# Patient Record
Sex: MALE | Race: WHITE | Employment: OTHER | URBAN - NONMETROPOLITAN AREA
[De-identification: names, ages, dates, MRNs, and addresses within clinical notes are randomized per-mention and may not be internally consistent; named-entity substitution may affect disease eponyms.]

---

## 2017-01-26 ENCOUNTER — PROCEDURE VISIT (OUTPATIENT)
Dept: CARDIOLOGY | Age: 52
End: 2017-01-26

## 2017-01-26 DIAGNOSIS — Z95.810 S/P ICD (INTERNAL CARDIAC DEFIBRILLATOR) PROCEDURE: Primary | ICD-10-CM

## 2017-01-26 PROCEDURE — 93295 DEV INTERROG REMOTE 1/2/MLT: CPT | Performed by: INTERNAL MEDICINE

## 2017-01-26 PROCEDURE — 93296 REM INTERROG EVL PM/IDS: CPT | Performed by: INTERNAL MEDICINE

## 2017-04-27 ENCOUNTER — PROCEDURE VISIT (OUTPATIENT)
Dept: CARDIOLOGY | Age: 52
End: 2017-04-27

## 2017-04-27 DIAGNOSIS — Z95.810 S/P ICD (INTERNAL CARDIAC DEFIBRILLATOR) PROCEDURE: Primary | ICD-10-CM

## 2017-04-27 PROCEDURE — 93295 DEV INTERROG REMOTE 1/2/MLT: CPT | Performed by: INTERNAL MEDICINE

## 2017-04-27 PROCEDURE — 93296 REM INTERROG EVL PM/IDS: CPT | Performed by: INTERNAL MEDICINE

## 2017-05-17 ENCOUNTER — OFFICE VISIT (OUTPATIENT)
Dept: CARDIOLOGY | Age: 52
End: 2017-05-17

## 2017-05-17 VITALS
BODY MASS INDEX: 31.36 KG/M2 | SYSTOLIC BLOOD PRESSURE: 132 MMHG | HEIGHT: 71 IN | DIASTOLIC BLOOD PRESSURE: 80 MMHG | HEART RATE: 64 BPM | WEIGHT: 224 LBS

## 2017-05-17 DIAGNOSIS — I21.4 NSTEMI (NON-ST ELEVATED MYOCARDIAL INFARCTION) (HCC): ICD-10-CM

## 2017-05-17 DIAGNOSIS — Z98.890 S/P CARDIAC CATHETERIZATION: Primary | ICD-10-CM

## 2017-05-17 DIAGNOSIS — Z95.810 S/P ICD (INTERNAL CARDIAC DEFIBRILLATOR) PROCEDURE: ICD-10-CM

## 2017-05-17 PROCEDURE — 99213 OFFICE O/P EST LOW 20 MIN: CPT | Performed by: INTERNAL MEDICINE

## 2017-05-17 RX ORDER — ATORVASTATIN CALCIUM 40 MG/1
40 TABLET, FILM COATED ORAL NIGHTLY
Qty: 90 TABLET | Refills: 1 | Status: SHIPPED | OUTPATIENT
Start: 2017-05-17 | End: 2017-11-13 | Stop reason: SDUPTHER

## 2017-05-17 RX ORDER — RAMIPRIL 5 MG/1
5 CAPSULE ORAL NIGHTLY
Qty: 90 CAPSULE | Refills: 1 | Status: SHIPPED | OUTPATIENT
Start: 2017-05-17 | End: 2017-11-13 | Stop reason: SDUPTHER

## 2017-05-17 RX ORDER — CLOPIDOGREL BISULFATE 75 MG/1
75 TABLET ORAL DAILY
Qty: 90 TABLET | Refills: 1 | Status: SHIPPED | OUTPATIENT
Start: 2017-05-17 | End: 2017-11-13 | Stop reason: SDUPTHER

## 2017-05-17 ASSESSMENT — ENCOUNTER SYMPTOMS
EYES NEGATIVE: 1
RESPIRATORY NEGATIVE: 1
GASTROINTESTINAL NEGATIVE: 1

## 2017-07-26 ENCOUNTER — PROCEDURE VISIT (OUTPATIENT)
Dept: CARDIOLOGY CLINIC | Age: 52
End: 2017-07-26

## 2017-07-26 DIAGNOSIS — Z95.810 S/P ICD (INTERNAL CARDIAC DEFIBRILLATOR) PROCEDURE: Primary | ICD-10-CM

## 2017-10-18 ENCOUNTER — PROCEDURE VISIT (OUTPATIENT)
Dept: CARDIOLOGY CLINIC | Age: 52
End: 2017-10-18
Payer: COMMERCIAL

## 2017-10-18 DIAGNOSIS — Z95.810 S/P ICD (INTERNAL CARDIAC DEFIBRILLATOR) PROCEDURE: Primary | ICD-10-CM

## 2017-10-18 PROCEDURE — 93296 REM INTERROG EVL PM/IDS: CPT | Performed by: INTERNAL MEDICINE

## 2017-10-18 PROCEDURE — 93295 DEV INTERROG REMOTE 1/2/MLT: CPT | Performed by: INTERNAL MEDICINE

## 2017-11-13 RX ORDER — CLOPIDOGREL BISULFATE 75 MG/1
75 TABLET ORAL DAILY
Qty: 90 TABLET | Refills: 1 | Status: SHIPPED | OUTPATIENT
Start: 2017-11-13 | End: 2018-03-19 | Stop reason: SDUPTHER

## 2017-11-13 RX ORDER — RAMIPRIL 5 MG/1
5 CAPSULE ORAL NIGHTLY
Qty: 90 CAPSULE | Refills: 1 | Status: SHIPPED | OUTPATIENT
Start: 2017-11-13 | End: 2018-03-19 | Stop reason: SDUPTHER

## 2017-11-13 RX ORDER — ATORVASTATIN CALCIUM 40 MG/1
40 TABLET, FILM COATED ORAL NIGHTLY
Qty: 90 TABLET | Refills: 1 | Status: SHIPPED | OUTPATIENT
Start: 2017-11-13 | End: 2017-12-13 | Stop reason: DRUGHIGH

## 2017-12-13 ENCOUNTER — OFFICE VISIT (OUTPATIENT)
Dept: CARDIOLOGY CLINIC | Age: 52
End: 2017-12-13
Payer: COMMERCIAL

## 2017-12-13 VITALS
HEART RATE: 61 BPM | DIASTOLIC BLOOD PRESSURE: 66 MMHG | HEIGHT: 72 IN | WEIGHT: 221.4 LBS | BODY MASS INDEX: 29.99 KG/M2 | SYSTOLIC BLOOD PRESSURE: 132 MMHG

## 2017-12-13 DIAGNOSIS — I25.119 CORONARY ARTERY DISEASE INVOLVING NATIVE CORONARY ARTERY OF NATIVE HEART WITH ANGINA PECTORIS (HCC): Primary | ICD-10-CM

## 2017-12-13 PROCEDURE — 99213 OFFICE O/P EST LOW 20 MIN: CPT | Performed by: INTERNAL MEDICINE

## 2017-12-13 PROCEDURE — 93000 ELECTROCARDIOGRAM COMPLETE: CPT | Performed by: INTERNAL MEDICINE

## 2017-12-13 RX ORDER — ATORVASTATIN CALCIUM 80 MG/1
80 TABLET, FILM COATED ORAL NIGHTLY
Qty: 90 TABLET | Refills: 3 | Status: SHIPPED | OUTPATIENT
Start: 2017-12-13 | End: 2018-03-26 | Stop reason: SDUPTHER

## 2017-12-13 ASSESSMENT — ENCOUNTER SYMPTOMS
GASTROINTESTINAL NEGATIVE: 1
EYES NEGATIVE: 1
RESPIRATORY NEGATIVE: 1

## 2017-12-13 NOTE — PROGRESS NOTES
Patient is here for 6 month follow up    EKG done today. Denies cp, sob palpitations, dizziness and YAKELIN.

## 2018-01-23 ENCOUNTER — PROCEDURE VISIT (OUTPATIENT)
Dept: CARDIOLOGY CLINIC | Age: 53
End: 2018-01-23
Payer: COMMERCIAL

## 2018-01-23 DIAGNOSIS — Z95.810 S/P ICD (INTERNAL CARDIAC DEFIBRILLATOR) PROCEDURE: Primary | ICD-10-CM

## 2018-01-23 PROCEDURE — 93261 INTERROGATE SUBQ DEFIB: CPT | Performed by: INTERNAL MEDICINE

## 2018-01-24 ENCOUNTER — TELEPHONE (OUTPATIENT)
Dept: CARDIOLOGY CLINIC | Age: 53
End: 2018-01-24

## 2018-03-08 ENCOUNTER — TELEPHONE (OUTPATIENT)
Dept: CARDIOLOGY CLINIC | Age: 53
End: 2018-03-08

## 2018-03-09 ENCOUNTER — PROCEDURE VISIT (OUTPATIENT)
Dept: CARDIOLOGY CLINIC | Age: 53
End: 2018-03-09
Payer: COMMERCIAL

## 2018-03-09 DIAGNOSIS — Z95.810 S/P ICD (INTERNAL CARDIAC DEFIBRILLATOR) PROCEDURE: Primary | ICD-10-CM

## 2018-03-09 PROCEDURE — 93261 INTERROGATE SUBQ DEFIB: CPT | Performed by: INTERNAL MEDICINE

## 2018-03-19 RX ORDER — ATORVASTATIN CALCIUM 40 MG/1
TABLET, FILM COATED ORAL
Qty: 90 TABLET | Refills: 0 | Status: SHIPPED | OUTPATIENT
Start: 2018-03-19 | End: 2018-05-30 | Stop reason: ALTCHOICE

## 2018-03-19 RX ORDER — RAMIPRIL 5 MG/1
CAPSULE ORAL
Qty: 90 CAPSULE | Refills: 0 | Status: SHIPPED | OUTPATIENT
Start: 2018-03-19 | End: 2018-05-30 | Stop reason: SDUPTHER

## 2018-03-19 RX ORDER — CLOPIDOGREL BISULFATE 75 MG/1
TABLET ORAL
Qty: 90 TABLET | Refills: 0 | Status: SHIPPED | OUTPATIENT
Start: 2018-03-19 | End: 2018-05-30 | Stop reason: SDUPTHER

## 2018-03-28 RX ORDER — ATORVASTATIN CALCIUM 80 MG/1
80 TABLET, FILM COATED ORAL NIGHTLY
Qty: 90 TABLET | Refills: 3 | OUTPATIENT
Start: 2018-03-28 | End: 2018-08-08 | Stop reason: SDUPTHER

## 2018-04-02 ENCOUNTER — TELEPHONE (OUTPATIENT)
Dept: CARDIOLOGY CLINIC | Age: 53
End: 2018-04-02

## 2018-04-25 ENCOUNTER — PROCEDURE VISIT (OUTPATIENT)
Dept: CARDIOLOGY CLINIC | Age: 53
End: 2018-04-25
Payer: COMMERCIAL

## 2018-04-25 DIAGNOSIS — Z95.810 S/P ICD (INTERNAL CARDIAC DEFIBRILLATOR) PROCEDURE: Primary | ICD-10-CM

## 2018-04-25 PROCEDURE — 93261 INTERROGATE SUBQ DEFIB: CPT | Performed by: INTERNAL MEDICINE

## 2018-05-30 ENCOUNTER — OFFICE VISIT (OUTPATIENT)
Dept: CARDIOLOGY CLINIC | Age: 53
End: 2018-05-30
Payer: COMMERCIAL

## 2018-05-30 VITALS
HEART RATE: 71 BPM | DIASTOLIC BLOOD PRESSURE: 74 MMHG | WEIGHT: 223 LBS | HEIGHT: 71 IN | BODY MASS INDEX: 31.22 KG/M2 | SYSTOLIC BLOOD PRESSURE: 132 MMHG

## 2018-05-30 DIAGNOSIS — Z95.810 S/P ICD (INTERNAL CARDIAC DEFIBRILLATOR) PROCEDURE: ICD-10-CM

## 2018-05-30 DIAGNOSIS — Z98.890 S/P CARDIAC CATHETERIZATION: ICD-10-CM

## 2018-05-30 DIAGNOSIS — I25.119 CORONARY ARTERY DISEASE INVOLVING NATIVE CORONARY ARTERY OF NATIVE HEART WITH ANGINA PECTORIS (HCC): Primary | ICD-10-CM

## 2018-05-30 PROCEDURE — 93000 ELECTROCARDIOGRAM COMPLETE: CPT | Performed by: INTERNAL MEDICINE

## 2018-05-30 PROCEDURE — 99213 OFFICE O/P EST LOW 20 MIN: CPT | Performed by: INTERNAL MEDICINE

## 2018-05-30 RX ORDER — RAMIPRIL 5 MG/1
CAPSULE ORAL
Qty: 90 CAPSULE | Refills: 3 | Status: SHIPPED | OUTPATIENT
Start: 2018-05-30 | End: 2019-08-19 | Stop reason: SDUPTHER

## 2018-05-30 RX ORDER — NITROGLYCERIN 0.4 MG/1
TABLET SUBLINGUAL
Qty: 25 TABLET | Refills: 3 | Status: SHIPPED | OUTPATIENT
Start: 2018-05-30

## 2018-05-30 RX ORDER — CLOPIDOGREL BISULFATE 75 MG/1
TABLET ORAL
Qty: 90 TABLET | Refills: 3 | Status: SHIPPED | OUTPATIENT
Start: 2018-05-30 | End: 2019-08-19 | Stop reason: SDUPTHER

## 2018-05-30 ASSESSMENT — ENCOUNTER SYMPTOMS
GASTROINTESTINAL NEGATIVE: 1
EYES NEGATIVE: 1
RESPIRATORY NEGATIVE: 1

## 2018-07-25 ENCOUNTER — PROCEDURE VISIT (OUTPATIENT)
Dept: CARDIOLOGY CLINIC | Age: 53
End: 2018-07-25
Payer: COMMERCIAL

## 2018-07-25 DIAGNOSIS — Z95.810 S/P ICD (INTERNAL CARDIAC DEFIBRILLATOR) PROCEDURE: Primary | ICD-10-CM

## 2018-07-25 PROCEDURE — 93296 REM INTERROG EVL PM/IDS: CPT | Performed by: INTERNAL MEDICINE

## 2018-07-25 PROCEDURE — 93295 DEV INTERROG REMOTE 1/2/MLT: CPT | Performed by: INTERNAL MEDICINE

## 2018-08-08 RX ORDER — ATORVASTATIN CALCIUM 80 MG/1
80 TABLET, FILM COATED ORAL NIGHTLY
Qty: 90 TABLET | Refills: 3 | Status: SHIPPED | OUTPATIENT
Start: 2018-08-08 | End: 2019-01-14 | Stop reason: SDUPTHER

## 2018-10-31 ENCOUNTER — PROCEDURE VISIT (OUTPATIENT)
Dept: CARDIOLOGY CLINIC | Age: 53
End: 2018-10-31
Payer: COMMERCIAL

## 2018-10-31 DIAGNOSIS — Z95.810 S/P ICD (INTERNAL CARDIAC DEFIBRILLATOR) PROCEDURE: Primary | ICD-10-CM

## 2018-10-31 PROCEDURE — 93295 DEV INTERROG REMOTE 1/2/MLT: CPT | Performed by: INTERNAL MEDICINE

## 2018-10-31 PROCEDURE — 93296 REM INTERROG EVL PM/IDS: CPT | Performed by: INTERNAL MEDICINE

## 2018-12-17 ENCOUNTER — TELEPHONE (OUTPATIENT)
Dept: CARDIOLOGY CLINIC | Age: 53
End: 2018-12-17

## 2019-01-02 ENCOUNTER — OFFICE VISIT (OUTPATIENT)
Dept: CARDIOLOGY CLINIC | Age: 54
End: 2019-01-02
Payer: COMMERCIAL

## 2019-01-02 VITALS
SYSTOLIC BLOOD PRESSURE: 132 MMHG | HEIGHT: 71 IN | HEART RATE: 58 BPM | WEIGHT: 228 LBS | DIASTOLIC BLOOD PRESSURE: 88 MMHG | BODY MASS INDEX: 31.92 KG/M2

## 2019-01-02 DIAGNOSIS — I25.10 CORONARY ARTERY DISEASE INVOLVING NATIVE CORONARY ARTERY OF NATIVE HEART WITHOUT ANGINA PECTORIS: Primary | ICD-10-CM

## 2019-01-02 DIAGNOSIS — E78.01 FAMILIAL HYPERCHOLESTEROLEMIA: ICD-10-CM

## 2019-01-02 DIAGNOSIS — Z95.810 ICD (IMPLANTABLE CARDIOVERTER-DEFIBRILLATOR) IN PLACE: ICD-10-CM

## 2019-01-02 DIAGNOSIS — I10 ESSENTIAL HYPERTENSION: ICD-10-CM

## 2019-01-02 PROCEDURE — 99214 OFFICE O/P EST MOD 30 MIN: CPT | Performed by: NUCLEAR MEDICINE

## 2019-01-09 DIAGNOSIS — I25.10 CORONARY ARTERY DISEASE INVOLVING NATIVE CORONARY ARTERY OF NATIVE HEART WITHOUT ANGINA PECTORIS: ICD-10-CM

## 2019-01-09 DIAGNOSIS — Z95.810 ICD (IMPLANTABLE CARDIOVERTER-DEFIBRILLATOR) IN PLACE: ICD-10-CM

## 2019-01-14 RX ORDER — ATORVASTATIN CALCIUM 80 MG/1
80 TABLET, FILM COATED ORAL NIGHTLY
Qty: 90 TABLET | Refills: 3 | Status: SHIPPED | OUTPATIENT
Start: 2019-01-14 | End: 2019-05-28 | Stop reason: DRUGHIGH

## 2019-02-04 ENCOUNTER — PROCEDURE VISIT (OUTPATIENT)
Dept: CARDIOLOGY CLINIC | Age: 54
End: 2019-02-04
Payer: COMMERCIAL

## 2019-02-04 DIAGNOSIS — Z95.810 S/P ICD (INTERNAL CARDIAC DEFIBRILLATOR) PROCEDURE: Primary | ICD-10-CM

## 2019-02-04 PROCEDURE — 93296 REM INTERROG EVL PM/IDS: CPT | Performed by: INTERNAL MEDICINE

## 2019-02-04 PROCEDURE — 93295 DEV INTERROG REMOTE 1/2/MLT: CPT | Performed by: INTERNAL MEDICINE

## 2019-04-17 ENCOUNTER — TELEPHONE (OUTPATIENT)
Dept: CARDIOLOGY CLINIC | Age: 54
End: 2019-04-17

## 2019-04-17 DIAGNOSIS — Z95.810 S/P ICD (INTERNAL CARDIAC DEFIBRILLATOR) PROCEDURE: Primary | ICD-10-CM

## 2019-04-17 DIAGNOSIS — I10 ESSENTIAL HYPERTENSION: ICD-10-CM

## 2019-04-17 DIAGNOSIS — E78.01 FAMILIAL HYPERCHOLESTEROLEMIA: ICD-10-CM

## 2019-04-17 DIAGNOSIS — I25.10 CORONARY ARTERY DISEASE INVOLVING NATIVE CORONARY ARTERY OF NATIVE HEART WITHOUT ANGINA PECTORIS: ICD-10-CM

## 2019-04-22 LAB
A/G RATIO: NORMAL
ALBUMIN SERPL-MCNC: 4.5 G/DL
ALP BLD-CCNC: 77 U/L
ALT SERPL-CCNC: 42 U/L
AST SERPL-CCNC: 23 U/L
BILIRUB SERPL-MCNC: 0.6 MG/DL (ref 0.1–1.4)
BILIRUBIN DIRECT: 0.2 MG/DL
BILIRUBIN, INDIRECT: NORMAL
CHOLESTEROL, TOTAL: 72 MG/DL
CHOLESTEROL/HDL RATIO: NORMAL
GLOBULIN: NORMAL
HDLC SERPL-MCNC: 36 MG/DL (ref 35–70)
LDL CHOLESTEROL CALCULATED: 20 MG/DL (ref 0–160)
PROTEIN TOTAL: 6.7 G/DL
TRIGL SERPL-MCNC: 78 MG/DL
VLDLC SERPL CALC-MCNC: 16 MG/DL

## 2019-05-13 ENCOUNTER — PROCEDURE VISIT (OUTPATIENT)
Dept: CARDIOLOGY CLINIC | Age: 54
End: 2019-05-13
Payer: COMMERCIAL

## 2019-05-13 DIAGNOSIS — Z95.810 S/P ICD (INTERNAL CARDIAC DEFIBRILLATOR) PROCEDURE: Primary | ICD-10-CM

## 2019-05-13 PROCEDURE — 93296 REM INTERROG EVL PM/IDS: CPT | Performed by: INTERNAL MEDICINE

## 2019-05-13 PROCEDURE — 93295 DEV INTERROG REMOTE 1/2/MLT: CPT | Performed by: INTERNAL MEDICINE

## 2019-05-28 ENCOUNTER — OFFICE VISIT (OUTPATIENT)
Dept: CARDIOLOGY CLINIC | Age: 54
End: 2019-05-28
Payer: COMMERCIAL

## 2019-05-28 VITALS
SYSTOLIC BLOOD PRESSURE: 124 MMHG | HEART RATE: 68 BPM | WEIGHT: 225 LBS | DIASTOLIC BLOOD PRESSURE: 76 MMHG | HEIGHT: 71 IN | BODY MASS INDEX: 31.5 KG/M2

## 2019-05-28 DIAGNOSIS — I10 ESSENTIAL HYPERTENSION: ICD-10-CM

## 2019-05-28 DIAGNOSIS — E78.01 FAMILIAL HYPERCHOLESTEROLEMIA: ICD-10-CM

## 2019-05-28 DIAGNOSIS — I25.10 CORONARY ARTERY DISEASE INVOLVING NATIVE CORONARY ARTERY OF NATIVE HEART WITHOUT ANGINA PECTORIS: Primary | ICD-10-CM

## 2019-05-28 PROCEDURE — 99213 OFFICE O/P EST LOW 20 MIN: CPT | Performed by: NUCLEAR MEDICINE

## 2019-05-28 RX ORDER — ATORVASTATIN CALCIUM 20 MG/1
20 TABLET, FILM COATED ORAL DAILY
COMMUNITY
End: 2019-05-28 | Stop reason: SDUPTHER

## 2019-05-28 RX ORDER — ATORVASTATIN CALCIUM 20 MG/1
20 TABLET, FILM COATED ORAL DAILY
Qty: 90 TABLET | Refills: 3 | Status: SHIPPED | OUTPATIENT
Start: 2019-05-28 | End: 2019-08-19 | Stop reason: SDUPTHER

## 2019-05-28 NOTE — PROGRESS NOTES
Patient agreed with treatment plan. Follow up as directed.     Electronically signedby Armida Sterling MD on 5/28/2019 at 2:49 PM

## 2019-08-19 RX ORDER — CLOPIDOGREL BISULFATE 75 MG/1
TABLET ORAL
Qty: 90 TABLET | Refills: 3 | Status: SHIPPED | OUTPATIENT
Start: 2019-08-19 | End: 2020-08-02 | Stop reason: SDUPTHER

## 2019-08-19 RX ORDER — ATORVASTATIN CALCIUM 20 MG/1
20 TABLET, FILM COATED ORAL DAILY
Qty: 90 TABLET | Refills: 3 | Status: SHIPPED | OUTPATIENT
Start: 2019-08-19 | End: 2020-06-02 | Stop reason: ALTCHOICE

## 2019-08-19 RX ORDER — RAMIPRIL 5 MG/1
CAPSULE ORAL
Qty: 90 CAPSULE | Refills: 3 | Status: CANCELLED | OUTPATIENT
Start: 2019-08-19

## 2019-08-19 RX ORDER — RAMIPRIL 5 MG/1
CAPSULE ORAL
Qty: 90 CAPSULE | Refills: 3 | Status: SHIPPED | OUTPATIENT
Start: 2019-08-19 | End: 2020-08-02 | Stop reason: SDUPTHER

## 2019-08-19 RX ORDER — RAMIPRIL 5 MG/1
CAPSULE ORAL
Qty: 90 CAPSULE | Refills: 3 | Status: SHIPPED | OUTPATIENT
Start: 2019-08-19

## 2019-08-20 ENCOUNTER — PROCEDURE VISIT (OUTPATIENT)
Dept: CARDIOLOGY CLINIC | Age: 54
End: 2019-08-20
Payer: COMMERCIAL

## 2019-08-20 DIAGNOSIS — Z95.810 S/P ICD (INTERNAL CARDIAC DEFIBRILLATOR) PROCEDURE: Primary | ICD-10-CM

## 2019-08-20 PROCEDURE — 93295 DEV INTERROG REMOTE 1/2/MLT: CPT | Performed by: INTERNAL MEDICINE

## 2019-08-20 PROCEDURE — 93296 REM INTERROG EVL PM/IDS: CPT | Performed by: INTERNAL MEDICINE

## 2019-11-11 LAB
A/G RATIO: 2.3
ALBUMIN SERPL-MCNC: 4.5 G/DL
ALP BLD-CCNC: 69 U/L
ALT SERPL-CCNC: 41 U/L
AST SERPL-CCNC: 23 U/L
BILIRUB SERPL-MCNC: 0.9 MG/DL (ref 0.1–1.4)
BILIRUBIN DIRECT: 0.34 MG/DL
BILIRUBIN, INDIRECT: NORMAL
CHOLESTEROL, TOTAL: 57 MG/DL
CHOLESTEROL/HDL RATIO: 1.5
GLOBULIN: 2
HDLC SERPL-MCNC: 37 MG/DL (ref 35–70)
LDL CHOLESTEROL CALCULATED: 1 MG/DL (ref 0–160)
PROTEIN TOTAL: 6.5 G/DL
TRIGL SERPL-MCNC: 96 MG/DL
VLDLC SERPL CALC-MCNC: 19 MG/DL

## 2019-11-15 ENCOUNTER — TELEPHONE (OUTPATIENT)
Dept: CARDIOLOGY CLINIC | Age: 54
End: 2019-11-15

## 2020-02-12 ENCOUNTER — PATIENT MESSAGE (OUTPATIENT)
Dept: CARDIOLOGY CLINIC | Age: 55
End: 2020-02-12

## 2020-02-12 ENCOUNTER — TELEPHONE (OUTPATIENT)
Dept: CARDIOLOGY CLINIC | Age: 55
End: 2020-02-12

## 2020-02-12 NOTE — TELEPHONE ENCOUNTER
Maureen from [facility] states patient needs Lipid and direct LDL labs done for new prior auth for repatha. Message sent to patient in [facility] requesting a lab to fax orders too. Results will need faxed to Maureen at [facility] [phone].

## 2020-02-20 ENCOUNTER — PATIENT MESSAGE (OUTPATIENT)
Dept: CARDIOLOGY CLINIC | Age: 55
End: 2020-02-20

## 2020-02-20 LAB
CHOLESTEROL, TOTAL: 94 MG/DL
CHOLESTEROL/HDL RATIO: NORMAL
HDLC SERPL-MCNC: 39 MG/DL (ref 35–70)
LDL CHOLESTEROL CALCULATED: 16 MG/DL (ref 0–160)
TRIGL SERPL-MCNC: 194 MG/DL
VLDLC SERPL CALC-MCNC: 39 MG/DL

## 2020-02-26 ENCOUNTER — PROCEDURE VISIT (OUTPATIENT)
Dept: CARDIOLOGY CLINIC | Age: 55
End: 2020-02-26
Payer: COMMERCIAL

## 2020-02-26 PROCEDURE — 93296 REM INTERROG EVL PM/IDS: CPT | Performed by: INTERNAL MEDICINE

## 2020-02-26 PROCEDURE — 93295 DEV INTERROG REMOTE 1/2/MLT: CPT | Performed by: INTERNAL MEDICINE

## 2020-03-23 ENCOUNTER — TELEPHONE (OUTPATIENT)
Dept: CARDIOLOGY CLINIC | Age: 55
End: 2020-03-23

## 2020-03-23 RX ORDER — IPRATROPIUM BROMIDE AND ALBUTEROL SULFATE 2.5; .5 MG/3ML; MG/3ML
1 SOLUTION RESPIRATORY (INHALATION) EVERY 4 HOURS
COMMUNITY
End: 2020-03-23 | Stop reason: SDUPTHER

## 2020-03-23 NOTE — TELEPHONE ENCOUNTER
Okay to refill? If so, Rodo Harvey please          Is it possible to get a refill on the ipratropium/albuterol  solution 0.5-2.5? He was prescribed this after his heart attack for respiratory issues and has used it occasionally for breathing concerns. Just trying to think ahead in the event he experiences any minor breathing issues. Most certainly don't want to go to the hospital at this time.   Thx

## 2020-03-24 ENCOUNTER — TELEPHONE (OUTPATIENT)
Dept: CARDIOLOGY CLINIC | Age: 55
End: 2020-03-24

## 2020-03-24 RX ORDER — IPRATROPIUM BROMIDE AND ALBUTEROL SULFATE 2.5; .5 MG/3ML; MG/3ML
1 SOLUTION RESPIRATORY (INHALATION) EVERY 4 HOURS
Qty: 30 VIAL | Refills: 0 | Status: SHIPPED | OUTPATIENT
Start: 2020-03-24 | End: 2020-07-10 | Stop reason: SDUPTHER

## 2020-03-24 NOTE — TELEPHONE ENCOUNTER
Telma Molina MD 1 hour ago (1:35 PM)         In light of the current Covid information - Tylenol versus Advil products etc, what should I take if I get sick?   Since the heart attack I've been told to avoid all Tylenol.      Thank you

## 2020-06-02 ENCOUNTER — OFFICE VISIT (OUTPATIENT)
Dept: CARDIOLOGY CLINIC | Age: 55
End: 2020-06-02
Payer: COMMERCIAL

## 2020-06-02 ENCOUNTER — PROCEDURE VISIT (OUTPATIENT)
Dept: CARDIOLOGY CLINIC | Age: 55
End: 2020-06-02
Payer: COMMERCIAL

## 2020-06-02 VITALS
WEIGHT: 217.15 LBS | HEART RATE: 69 BPM | BODY MASS INDEX: 30.4 KG/M2 | DIASTOLIC BLOOD PRESSURE: 85 MMHG | SYSTOLIC BLOOD PRESSURE: 147 MMHG | HEIGHT: 71 IN

## 2020-06-02 PROCEDURE — 93295 DEV INTERROG REMOTE 1/2/MLT: CPT | Performed by: INTERNAL MEDICINE

## 2020-06-02 PROCEDURE — 93296 REM INTERROG EVL PM/IDS: CPT | Performed by: INTERNAL MEDICINE

## 2020-06-02 PROCEDURE — 99213 OFFICE O/P EST LOW 20 MIN: CPT | Performed by: NUCLEAR MEDICINE

## 2020-06-02 NOTE — PROGRESS NOTES
tobacco: Former User     Types: Chew    Tobacco comment: started 5 year ago, occasional chewing tobacco   Substance Use Topics    Alcohol use: Yes     Alcohol/week: 1.0 standard drinks     Types: 1 Cans of beer per week     Comment: socially       Current Outpatient Medications   Medication Sig Dispense Refill    ipratropium-albuterol (DUONEB) 0.5-2.5 (3) MG/3ML SOLN nebulizer solution Inhale 3 mLs into the lungs every 4 hours 30 vial 0    clopidogrel (PLAVIX) 75 MG tablet TAKE ONE TABLET BY MOUTH ONCE DAILY 90 tablet 3    metoprolol tartrate (LOPRESSOR) 25 MG tablet TAKE ONE TABLET BY MOUTH TWICE DAILY 180 tablet 3    ramipril (ALTACE) 5 MG capsule TAKE ONE TABLET BY MOUTH NIGHTLY 90 capsule 3    Evolocumab 140 MG/ML SOAJ Inject 1 Syringe into the skin every 14 days 2 pen 11    ramipril (ALTACE) 5 MG capsule TAKE ONE TABLET BY MOUTH NIGHTLY 90 capsule 3    magnesium oxide (MAG-OX) 400 (241.3 Mg) MG TABS tablet Take 1 tablet by mouth daily 90 tablet 3    nitroGLYCERIN (NITROSTAT) 0.4 MG SL tablet Take 1 tablet for chest pain and repeat after 5 min if no relief, call 911 and take another dose 5 min later. Max of 3 doses in 15 min. 25 tablet 3    Coenzyme Q10 (COQ-10 PO) Take by mouth      aspirin 81 MG EC tablet Take 1 tablet by mouth daily 30 tablet 3    vitamin D (CHOLECALCIFEROL) 1000 UNIT TABS tablet Take 2 tablets by mouth daily (Patient not taking: Reported on 6/2/2020) 60 tablet 0     No current facility-administered medications for this visit.       No Known Allergies  Health Maintenance   Topic Date Due    Hepatitis C screen  1965    HIV screen  11/21/1978    DTaP/Tdap/Td vaccine (1 - Tdap) 11/21/1982    Shingles Vaccine (1 of 2) 11/21/2013    Colon cancer screen colonoscopy  11/21/2013    A1C test (Diabetic or Prediabetic)  08/05/2017    Potassium monitoring  08/16/2017    Creatinine monitoring  08/16/2017    Flu vaccine (Season Ended) 09/01/2020    Lipid screen  02/20/2021    directed.     Electronically signedby Gary Gaspar MD on 6/2/2020 at 3:39 PM

## 2020-07-10 RX ORDER — IPRATROPIUM BROMIDE AND ALBUTEROL SULFATE 2.5; .5 MG/3ML; MG/3ML
1 SOLUTION RESPIRATORY (INHALATION) EVERY 4 HOURS
Qty: 30 VIAL | Refills: 0 | Status: SHIPPED | OUTPATIENT
Start: 2020-07-10

## 2020-07-10 RX ORDER — EVOLOCUMAB 140 MG/ML
INJECTION, SOLUTION SUBCUTANEOUS
Qty: 2 PEN | Refills: 11 | Status: SHIPPED | OUTPATIENT
Start: 2020-07-10 | End: 2021-02-11 | Stop reason: SDUPTHER

## 2020-08-03 RX ORDER — CLOPIDOGREL BISULFATE 75 MG/1
TABLET ORAL
Qty: 90 TABLET | Refills: 0 | OUTPATIENT
Start: 2020-08-03

## 2020-08-03 RX ORDER — RAMIPRIL 5 MG/1
CAPSULE ORAL
Qty: 90 CAPSULE | Refills: 0 | OUTPATIENT
Start: 2020-08-03

## 2020-08-03 RX ORDER — RAMIPRIL 5 MG/1
CAPSULE ORAL
Qty: 90 CAPSULE | Refills: 2 | Status: SHIPPED | OUTPATIENT
Start: 2020-08-03 | End: 2021-05-17

## 2020-08-03 RX ORDER — CLOPIDOGREL BISULFATE 75 MG/1
TABLET ORAL
Qty: 90 TABLET | Refills: 2 | Status: SHIPPED | OUTPATIENT
Start: 2020-08-03 | End: 2021-05-17

## 2020-09-08 ENCOUNTER — PROCEDURE VISIT (OUTPATIENT)
Dept: CARDIOLOGY CLINIC | Age: 55
End: 2020-09-08
Payer: COMMERCIAL

## 2020-09-08 PROCEDURE — 93295 DEV INTERROG REMOTE 1/2/MLT: CPT | Performed by: NUCLEAR MEDICINE

## 2020-09-08 PROCEDURE — 93296 REM INTERROG EVL PM/IDS: CPT | Performed by: NUCLEAR MEDICINE

## 2020-12-11 ENCOUNTER — PROCEDURE VISIT (OUTPATIENT)
Dept: CARDIOLOGY CLINIC | Age: 55
End: 2020-12-11
Payer: COMMERCIAL

## 2020-12-11 ENCOUNTER — TELEPHONE (OUTPATIENT)
Dept: CARDIOLOGY CLINIC | Age: 55
End: 2020-12-11

## 2020-12-11 PROCEDURE — 93296 REM INTERROG EVL PM/IDS: CPT | Performed by: NUCLEAR MEDICINE

## 2020-12-11 PROCEDURE — 93295 DEV INTERROG REMOTE 1/2/MLT: CPT | Performed by: NUCLEAR MEDICINE

## 2020-12-11 NOTE — TELEPHONE ENCOUNTER
Aware of advisory on his SICD, scheduled for January to meet with rep in office to discuss.   The following letter was mailed to pt

## 2021-01-04 ENCOUNTER — NURSE ONLY (OUTPATIENT)
Dept: CARDIOLOGY CLINIC | Age: 56
End: 2021-01-04

## 2021-01-04 DIAGNOSIS — Z95.810 S/P ICD (INTERNAL CARDIAC DEFIBRILLATOR) PROCEDURE: Primary | ICD-10-CM

## 2021-01-04 NOTE — PROGRESS NOTES
PT WAS BROUGHT TO THE OFFICE TODAY FOR AN ADVISORY ON HIS SICD     PT WAS EDUCATED REGARDING ELECTRICAL OVERSTRESS AND PREMATURE BATTERY DEPLETION    PT OK WITH EDUCATION .  PT IS ALREADY ON HOME MONITORING  BATTERY 53% REMAINING  NO EPISODES     N/C LESS THAN 90 DAYS

## 2021-02-11 RX ORDER — EVOLOCUMAB 140 MG/ML
140 INJECTION, SOLUTION SUBCUTANEOUS
Qty: 6 PEN | Refills: 3 | Status: SHIPPED | OUTPATIENT
Start: 2021-02-11 | End: 2022-03-03 | Stop reason: SDUPTHER

## 2021-03-29 ENCOUNTER — TELEPHONE (OUTPATIENT)
Dept: CARDIOLOGY CLINIC | Age: 56
End: 2021-03-29

## 2021-03-29 NOTE — LETTER
.. [address] [place] [address] [address] [place] [address]  Phone: [phone]  Fax: [phone]    Dayne Raymundo MD        March 29, 2021    [address] [place] [id number]      Dear Belén Purcell: Thomas Angry Thomas Angry This is to inform you of a product recall involving your [employer] 175 defibrillator. This recall has be initiated due to potential electrical overstress and potential early battery depletion,   [facility] is not recommending explantation at this time. Our office will be monitoring your defibrillator with weekly home monitor downloads. Our staff will manage the scheduling of the downloads. Please assure your monitor is working and you are sleeping next to the monitor every night. If, at any time, we notice there is an issue with your lead, we will call you at home. If you need assistance with your home monitor, please call [facility] at [facility]. Our office will also be contacting you to schedule [facility] INTERROGATIONS. A [facility] Rep will be available for questions. If you have not yet heard from the office, please call us at [phone] to schedule your appointment.       Thank You,   [address] [place] [address]

## 2021-04-22 ENCOUNTER — PROCEDURE VISIT (OUTPATIENT)
Dept: CARDIOLOGY CLINIC | Age: 56
End: 2021-04-22

## 2021-04-22 DIAGNOSIS — Z95.810 S/P ICD (INTERNAL CARDIAC DEFIBRILLATOR) PROCEDURE: Primary | ICD-10-CM

## 2021-05-17 RX ORDER — CLOPIDOGREL BISULFATE 75 MG/1
TABLET ORAL
Qty: 90 TABLET | Refills: 0 | Status: SHIPPED | OUTPATIENT
Start: 2021-05-17 | End: 2021-05-17 | Stop reason: SDUPTHER

## 2021-05-17 RX ORDER — RAMIPRIL 5 MG/1
CAPSULE ORAL
Qty: 90 CAPSULE | Refills: 0 | Status: SHIPPED | OUTPATIENT
Start: 2021-05-17 | End: 2021-05-17 | Stop reason: SDUPTHER

## 2021-05-17 RX ORDER — CLOPIDOGREL BISULFATE 75 MG/1
TABLET ORAL
Qty: 90 TABLET | Refills: 0 | Status: SHIPPED | OUTPATIENT
Start: 2021-05-17 | End: 2021-06-08 | Stop reason: SDUPTHER

## 2021-05-17 RX ORDER — RAMIPRIL 5 MG/1
CAPSULE ORAL
Qty: 90 CAPSULE | Refills: 0 | Status: SHIPPED | OUTPATIENT
Start: 2021-05-17 | End: 2021-06-08 | Stop reason: SDUPTHER

## 2021-06-08 ENCOUNTER — OFFICE VISIT (OUTPATIENT)
Dept: CARDIOLOGY CLINIC | Age: 56
End: 2021-06-08
Payer: COMMERCIAL

## 2021-06-08 VITALS
BODY MASS INDEX: 30.1 KG/M2 | WEIGHT: 215 LBS | DIASTOLIC BLOOD PRESSURE: 80 MMHG | HEART RATE: 68 BPM | HEIGHT: 71 IN | SYSTOLIC BLOOD PRESSURE: 138 MMHG

## 2021-06-08 DIAGNOSIS — Z95.810 ICD (IMPLANTABLE CARDIOVERTER-DEFIBRILLATOR) IN PLACE: Primary | ICD-10-CM

## 2021-06-08 PROCEDURE — 99213 OFFICE O/P EST LOW 20 MIN: CPT | Performed by: NUCLEAR MEDICINE

## 2021-06-08 RX ORDER — CLOPIDOGREL BISULFATE 75 MG/1
TABLET ORAL
Qty: 90 TABLET | Refills: 3 | Status: SHIPPED | OUTPATIENT
Start: 2021-06-08 | End: 2021-08-26 | Stop reason: SDUPTHER

## 2021-06-08 RX ORDER — RAMIPRIL 5 MG/1
CAPSULE ORAL
Qty: 90 CAPSULE | Refills: 3 | Status: SHIPPED | OUTPATIENT
Start: 2021-06-08 | End: 2021-08-24 | Stop reason: SDUPTHER

## 2021-06-08 NOTE — PROGRESS NOTES
[address] [address] [address] [facility],4Th Floor [address] [address] [address]  Dept: [phone]  Dept Fax: [phone]  Loc: [phone]    Visit Date: 6/8/2021    Michelle Rushing is a 60 y.o. male who presents todayfor:  Chief Complaint   Patient presents with    1 Year Follow Up    Cardiomyopathy     Know ICD and previous SCD  Monitoring for a recall on the ICD  Early battery depletion   No chest pain   No changes in breathing  BP is stable  No dizziness  No syncope      HPI:  HPI  Past Medical History:   Diagnosis Date    CAD (coronary artery disease)     [facility]congest[facility] heart failure) ([facility])     Chronic kidney disease     Coronary artery disease involving native coronary artery of native heart with angina pectoris ([facility] [address] 9/6/2016    Hypertension     Hypoxic brain injury ([facility] [address] 08/04/2016    Cardiac arrest for 10 minutes    life vest 8/30/2016    NSTEMI (non-ST elevated myocardial infarction) ([facility] [address] 10/5/2016    Pneumonia     Precordial pain 9/6/2016    S/P cardiac catheterization: 8/8/2016: LM moderately calcified with no significant obstruction, LAD diffuse long segmental narrowing of about 70-75%, LCX OK, RCA OK. LVEF 40-45%. LVEDP 26 mmHg. 8/8/2016 8/8/2016: LM moderately calcified with no significant obstruction, LAD diffuse long segmental narrowing of about 70-75%, LCX OK, RCA OK. LVEF 40-45%. LVEDP 26 mmHg.  Dr. Rosemary Bell S/P ICD (internal cardiac defibrillator) procedure: SICD placed by Dr. Corrie Mccray October 11/2016 11/16/2016      Past Surgical History:   Procedure Laterality Date    COLONOSCOPY  03/2016    HERNIA REPAIR  2004    HERNIA REPAIR  2006     Family History   Problem Relation Age of Onset    Heart Disease Mother     Asthma Mother     Diabetes Mother     High Blood Pressure Mother     High Cholesterol Mother     Diabetes Father     Heart Disease Maternal Grandmother      Social History     Tobacco Use    Smoking status: Never Smoker  Smokeless tobacco: Former User     Types: Chew    Tobacco comment: started 5 year ago, occasional chewing tobacco   Substance Use Topics    Alcohol use: Yes     Alcohol/week: 1.0 standard drinks     Types: 1 Cans of beer per week     Comment: socially       Current Outpatient Medications   Medication Sig Dispense Refill    ramipril (ALTACE) 5 MG capsule TAKE 1 CAPSULE BY MOUTH NIGHTLY 90 capsule 0    clopidogrel (PLAVIX) 75 MG tablet Take 1 tablet by mouth once daily 90 tablet 0    metoprolol tartrate (LOPRESSOR) 25 MG tablet TAKE ONE TABLET BY MOUTH TWICE DAILY 180 tablet 0    Evolocumab (REPATHA SURECLICK) 796 MG/ML SOAJ Inject 140 pens as directed every 14 days 6 pen 3    ipratropium-albuterol (DUONEB) 0.5-2.5 (3) MG/3ML SOLN nebulizer solution Inhale 3 mLs into the lungs every 4 hours 30 vial 0    ramipril (ALTACE) 5 MG capsule TAKE ONE TABLET BY MOUTH NIGHTLY 90 capsule 3    magnesium oxide (MAG-OX) 400 (241.3 Mg) MG TABS tablet Take 1 tablet by mouth daily 90 tablet 3    nitroGLYCERIN (NITROSTAT) 0.4 MG SL tablet Take 1 tablet for chest pain and repeat after 5 min if no relief, call 911 and take another dose 5 min later. Max of 3 doses in 15 min. 25 tablet 3    Coenzyme Q10 (COQ-10 PO) Take by mouth      aspirin 81 MG EC tablet Take 1 tablet by mouth daily 30 tablet 3    vitamin D (CHOLECALCIFEROL) 1000 UNIT TABS tablet Take 2 tablets by mouth daily (Patient not taking: Reported on 6/2/2020) 60 tablet 0     No current facility-administered medications for this visit.      No Known Allergies  Health Maintenance   Topic Date Due    Hepatitis C screen  Never done    COVID-19 Vaccine (1) Never done    HIV screen  Never done    DTaP/Tdap/Td vaccine (1 - Tdap) Never done    Shingles Vaccine (1 of 2) Never done    Colon cancer screen colonoscopy  Never done    A1C test (Diabetic or Prediabetic)  08/05/2017    Potassium monitoring  08/16/2017    Creatinine monitoring  08/16/2017    Flu vaccine (Season Ended) 09/01/2021    Lipid screen  02/20/2025    Hepatitis A vaccine  Aged Out    Hepatitis B vaccine  Aged Out    Hib vaccine  Aged Out    Meningococcal (ACWY) vaccine  Aged Out    Pneumococcal 0-64 years Vaccine  Aged Out       Subjective:  Review of Systems  General:   No fever, no chills, No fatigue or weight loss  Pulmonary:    No dyspnea, no wheezing  Cardiac:    Denies recent chest pain,   GI:     No nausea or vomiting, no abdominal pain  Neuro:    No dizziness or light headedness,   Musculoskeletal:  No recent active issues  Extremities:   No edema, no obvious claudication       Objective:  Physical Exam  /80   Pulse 68   Ht 5' 11\" (1.803 m)   Wt 215 lb (97.5 kg)   BMI 29.99 kg/m²   General:   Well developed, well nourished  Lungs:   Clear to auscultation  Heart:    Normal S1 S2, Slight murmur. no rubs, no gallops  Abdomen:   Soft, non tender, no organomegalies, positive bowel sounds  Extremities:   No edema, no cyanosis, good peripheral pulses  Neurological:   Awake, alert, oriented. No obvious focal deficits  Musculoskelatal:  No obvious deformities    Assessment:      Diagnosis Orders   1. ICD (implantable cardioverter-defibrillator) in place     as above  Cardiac fair for now     Plan:  No follow-ups on file. As above  Continue risk factor modification and medical management  Thank you for allowing me to participate in the care of your patient. Please don't hesitate to contact me regarding any further issues related to the patient care    Orders Placed:  No orders of the defined types were placed in this encounter. Medications Prescribed:  No orders of the defined types were placed in this encounter. Discussed use, benefit, and side effects of prescribed medications. All patient questions answered. Pt voicedunderstanding. Instructed to continue current medications, diet and exercise. Continue risk factor modification and medical management.  Patient agreed with treatment plan. Follow up as directed.     Electronically signedby August Guerrier MD on 6/8/2021 at 3:16 PM

## 2021-07-26 ENCOUNTER — NURSE ONLY (OUTPATIENT)
Dept: CARDIOLOGY CLINIC | Age: 56
End: 2021-07-26
Payer: COMMERCIAL

## 2021-07-26 DIAGNOSIS — Z95.810 ICD (IMPLANTABLE CARDIOVERTER-DEFIBRILLATOR) IN PLACE: Primary | ICD-10-CM

## 2021-07-26 PROCEDURE — 93261 INTERROGATE SUBQ DEFIB: CPT | Performed by: INTERNAL MEDICINE

## 2021-07-26 NOTE — PROGRESS NOTES
DR Gina Geller PT   [place] [facility] SICD CHECK IN OFFICE   BATTERY 46% REMAINING  NO EPISODES   SENSING CONFIGURATIONS RAN AND PT IS PROGRAMMED IN PRIMARY

## 2021-08-24 RX ORDER — RAMIPRIL 5 MG/1
CAPSULE ORAL
Qty: 90 CAPSULE | Refills: 3 | Status: SHIPPED | OUTPATIENT
Start: 2021-08-24 | End: 2022-08-24 | Stop reason: SDUPTHER

## 2021-08-26 RX ORDER — CLOPIDOGREL BISULFATE 75 MG/1
TABLET ORAL
Qty: 90 TABLET | Refills: 3 | Status: SHIPPED | OUTPATIENT
Start: 2021-08-26 | End: 2022-08-24 | Stop reason: SDUPTHER

## 2021-08-26 RX ORDER — IPRATROPIUM BROMIDE AND ALBUTEROL SULFATE 2.5; .5 MG/3ML; MG/3ML
1 SOLUTION RESPIRATORY (INHALATION) EVERY 4 HOURS
Qty: 30 VIAL | Refills: 0 | Status: CANCELLED | OUTPATIENT
Start: 2021-08-26

## 2021-08-26 NOTE — TELEPHONE ENCOUNTER
From: Anaya Prajapati  To:  Office of Lucia Bah MD  Sent: 8/25/2021 5:49 PM EDT  Subject: Medication Renewal Request    Refills have been requested for the following medications:     ipratropium-albuterol (DUONEB) 0.5-2.5 (3) MG/3ML SOLN nebulizer solution [Marjorie Mckeon MD]     clopidogrel (PLAVIX) 75 MG tablet [Marjorie Mckeon MD]     metoprolol tartrate (LOPRESSOR) 25 MG tablet Lucia Bah MD]    Preferred pharmacy: [address]

## 2021-11-01 ENCOUNTER — NURSE ONLY (OUTPATIENT)
Dept: CARDIOLOGY CLINIC | Age: 56
End: 2021-11-01
Payer: COMMERCIAL

## 2021-11-01 DIAGNOSIS — Z95.810 ICD (IMPLANTABLE CARDIOVERTER-DEFIBRILLATOR) IN PLACE: Primary | ICD-10-CM

## 2021-11-01 PROCEDURE — 93282 PRGRMG EVAL IMPLANTABLE DFB: CPT | Performed by: INTERNAL MEDICINE

## 2022-01-31 ENCOUNTER — NURSE ONLY (OUTPATIENT)
Dept: CARDIOLOGY CLINIC | Age: 57
End: 2022-01-31
Payer: COMMERCIAL

## 2022-01-31 DIAGNOSIS — Z95.810 ICD (IMPLANTABLE CARDIOVERTER-DEFIBRILLATOR) IN PLACE: Primary | ICD-10-CM

## 2022-01-31 PROCEDURE — 93282 PRGRMG EVAL IMPLANTABLE DFB: CPT | Performed by: INTERNAL MEDICINE

## 2022-01-31 NOTE — PROGRESS NOTES
DR Ayesha Mireles PT   PT HERE FOR HIS 3 MTH SICD DEVICE CHECK IN OFFICE TODAY     BATTERY 40% REMAINING  NORMAL BATTERY FUNCTION     NO EPISODES   PROGRAMMED PRIMARY WITH 1 X GAIN SETTING

## 2022-02-11 ENCOUNTER — PROCEDURE VISIT (OUTPATIENT)
Dept: CARDIOLOGY CLINIC | Age: 57
End: 2022-02-11

## 2022-02-11 DIAGNOSIS — Z95.810 S/P ICD (INTERNAL CARDIAC DEFIBRILLATOR) PROCEDURE: Primary | ICD-10-CM

## 2022-02-11 NOTE — PROGRESS NOTES
DR Zapien Come PT   [facility] [place] SCI MONTHLY DOWNLOAD   BATTERY 40% REMAINING  NO EPISODES   N/C D/T LESS THAN 90 DAYS

## 2022-03-03 ENCOUNTER — PROCEDURE VISIT (OUTPATIENT)
Dept: CARDIOLOGY CLINIC | Age: 57
End: 2022-03-03
Payer: COMMERCIAL

## 2022-03-03 DIAGNOSIS — Z95.810 S/P ICD (INTERNAL CARDIAC DEFIBRILLATOR) PROCEDURE: Primary | ICD-10-CM

## 2022-03-03 PROCEDURE — 93295 DEV INTERROG REMOTE 1/2/MLT: CPT | Performed by: NUCLEAR MEDICINE

## 2022-03-03 PROCEDURE — 93296 REM INTERROG EVL PM/IDS: CPT | Performed by: NUCLEAR MEDICINE

## 2022-03-03 RX ORDER — EVOLOCUMAB 140 MG/ML
140 INJECTION, SOLUTION SUBCUTANEOUS
Qty: 6 PEN | Refills: 3 | Status: SHIPPED | OUTPATIENT
Start: 2022-03-03

## 2022-03-10 ENCOUNTER — TELEPHONE (OUTPATIENT)
Dept: CARDIOLOGY CLINIC | Age: 57
End: 2022-03-10

## 2022-03-10 DIAGNOSIS — E78.00 PURE HYPERCHOLESTEROLEMIA: Primary | ICD-10-CM

## 2022-03-10 NOTE — TELEPHONE ENCOUNTER
Received a request from [facility] dolores that pt needs new labs showing improvement     Pt will need new lipid profile     Also last rx was [facility] in [facility]   Where is he getting this filled?     LM For wife jason to call office

## 2022-03-10 NOTE — TELEPHONE ENCOUNTER
Tk Tsai returned call. Pt will have labs drawn at [facility].   Order faxed to [address] [place] [address] Lab [phone]

## 2022-04-25 ENCOUNTER — PROCEDURE VISIT (OUTPATIENT)
Dept: CARDIOLOGY CLINIC | Age: 57
End: 2022-04-25

## 2022-04-25 DIAGNOSIS — Z95.810 ICD (IMPLANTABLE CARDIOVERTER-DEFIBRILLATOR) IN PLACE: Primary | ICD-10-CM

## 2022-05-02 ENCOUNTER — NURSE ONLY (OUTPATIENT)
Dept: CARDIOLOGY CLINIC | Age: 57
End: 2022-05-02
Payer: COMMERCIAL

## 2022-05-02 DIAGNOSIS — Z95.810 S/P ICD (INTERNAL CARDIAC DEFIBRILLATOR) PROCEDURE: Primary | ICD-10-CM

## 2022-05-02 PROCEDURE — 93282 PRGRMG EVAL IMPLANTABLE DFB: CPT | Performed by: INTERNAL MEDICINE

## 2022-06-06 ENCOUNTER — PROCEDURE VISIT (OUTPATIENT)
Dept: CARDIOLOGY CLINIC | Age: 57
End: 2022-06-06

## 2022-06-06 DIAGNOSIS — Z95.810 S/P ICD (INTERNAL CARDIAC DEFIBRILLATOR) PROCEDURE: Primary | ICD-10-CM

## 2022-06-06 NOTE — PROGRESS NOTES
[place] [facility] sci sicd     Advisory device/overstress and premature battery deplete/monthly downloads     36% battery remaining   No events

## 2022-06-14 ENCOUNTER — OFFICE VISIT (OUTPATIENT)
Dept: CARDIOLOGY CLINIC | Age: 57
End: 2022-06-14
Payer: COMMERCIAL

## 2022-06-14 VITALS
BODY MASS INDEX: 28.04 KG/M2 | HEIGHT: 72 IN | HEART RATE: 92 BPM | DIASTOLIC BLOOD PRESSURE: 82 MMHG | SYSTOLIC BLOOD PRESSURE: 132 MMHG | WEIGHT: 207 LBS

## 2022-06-14 DIAGNOSIS — I10 PRIMARY HYPERTENSION: Primary | ICD-10-CM

## 2022-06-14 DIAGNOSIS — Z95.810 ICD (IMPLANTABLE CARDIOVERTER-DEFIBRILLATOR) IN PLACE: ICD-10-CM

## 2022-06-14 PROCEDURE — 99213 OFFICE O/P EST LOW 20 MIN: CPT | Performed by: NUCLEAR MEDICINE

## 2022-06-14 NOTE — PROGRESS NOTES
[address] [facility]  [address] [facility],4Th Floor [address] [facility]  Dept: [phone]  Dept Fax: [phone]  Loc: [phone]    Visit Date: 6/14/2022    Kd Jenkins is a 60 y.o. male who presents todayfor:  Chief Complaint   Patient presents with    Cardiomyopathy    Hypertension   known ICD for SCD  No chest pain   No changes in breathing  BP is stable   No dizziness  No syncope   On Repatha       HPI:  HPI  Past Medical History:   Diagnosis Date    CAD (coronary artery disease)     CH[facility]congest[facility] failure) [facility])     Chronic kidney disease     Coronary artery disease involving native coronary artery of native heart with angina pectoris ([facility] [address] 9/6/2016    Hypertension     Hypoxic brain injury ([facility] [address] 08/04/2016    Cardiac arrest for 10 minutes    life vest 8/30/2016    NSTEMI (non-ST elevated myocardial infarction) ([facility] [address] 10/5/2016    Pneumonia     Precordial pain 9/6/2016    S/P cardiac catheterization: 8/8/2016: LM moderately calcified with no significant obstruction, LAD diffuse long segmental narrowing of about 70-75%, LCX OK, RCA OK. LVEF 40-45%. LVEDP 26 mmHg. 8/8/2016 8/8/2016: LM moderately calcified with no significant obstruction, LAD diffuse long segmental narrowing of about 70-75%, LCX OK, RCA OK. LVEF 40-45%. LVEDP 26 mmHg.  Dr. Naa Jordan S/P ICD (internal cardiac defibrillator) procedure: SICD placed by Dr. Anaya [facility] October 11/2016 11/16/2016      Past Surgical History:   Procedure Laterality Date    COLONOSCOPY  03/2016    HERNIA REPAIR  2004    HERNIA REPAIR  2006     Family History   Problem Relation Age of Onset    Heart Disease Mother     Asthma Mother     Diabetes Mother     High Blood Pressure Mother     High Cholesterol Mother     Diabetes Father     Heart Disease Maternal Grandmother      Social History     Tobacco Use    Smoking status: Never Smoker    Smokeless tobacco: Former User     Types: [address] Tobacco comment: started 5 year ago, occasional chewing tobacco   Substance Use Topics    Alcohol use: Yes     Alcohol/week: 1.0 standard drink     Types: 1 Cans of beer per week     Comment: socially       Current Outpatient Medications   Medication Sig Dispense Refill    Evolocumab (REPATHA SURECLICK) 558 MG/ML SOAJ Inject 140 pens as directed every 14 days 6 pen 3    clopidogrel (PLAVIX) 75 MG tablet Take 1 tablet by mouth once daily 90 tablet 3    metoprolol tartrate (LOPRESSOR) 25 MG tablet TAKE ONE TABLET BY MOUTH TWICE DAILY 180 tablet 3    ipratropium-albuterol (DUONEB) 0.5-2.5 (3) MG/3ML SOLN nebulizer solution Inhale 3 mLs into the lungs every 4 hours 30 vial 0    ramipril (ALTACE) 5 MG capsule TAKE ONE TABLET BY MOUTH NIGHTLY 90 capsule 3    magnesium oxide (MAG-OX) 400 (241.3 Mg) MG TABS tablet Take 1 tablet by mouth daily 90 tablet 3    nitroGLYCERIN (NITROSTAT) 0.4 MG SL tablet Take 1 tablet for chest pain and repeat after 5 min if no relief, call 911 and take another dose 5 min later. Max of 3 doses in 15 min. 25 tablet 3    Coenzyme Q10 (COQ-10 PO) Take by mouth      aspirin 81 MG EC tablet Take 1 tablet by mouth daily 30 tablet 3    ramipril (ALTACE) 5 MG capsule TAKE 1 CAPSULE BY MOUTH NIGHTLY 90 capsule 3     No current facility-administered medications for this visit.      No Known Allergies  Health Maintenance   Topic Date Due    COVID-19 Vaccine (1) Never done    Pneumococcal 0-64 years Vaccine (1 - PCV) Never done    Depression Screen  Never done    HIV screen  Never done    Hepatitis C screen  Never done    DTaP/Tdap/Td vaccine (1 - Tdap) Never done    Prostate Specific Antigen (PSA) Screening or Monitoring  Never done    Colorectal Cancer Screen  Never done    Shingles vaccine (1 of 2) Never done    A1C test (Diabetic or Prediabetic)  08/05/2017    Flu vaccine (Season Ended) 09/01/2022    Lipids  03/11/2027    Hepatitis A vaccine  Aged Out    Hepatitis B vaccine  Aged Out    Hib vaccine  Aged Out    Meningococcal (ACWY) vaccine  Aged Out       Subjective:  Review of Systems  General:   No fever, no chills, No fatigue or weight loss  Pulmonary:    No dyspnea, no wheezing  Cardiac:    Denies recent chest pain,   GI:     No nausea or vomiting, no abdominal pain  Neuro:    No dizziness or light headedness,   Musculoskeletal:  No recent active issues  Extremities:   No edema, no obvious claudication       Objective:  Physical Exam  /82   Pulse 92   Ht 6' (1.829 m)   Wt 207 lb (93.9 kg)   BMI 28.07 kg/m²   General:   Well developed, well nourished  Lungs:   Clear to auscultation  Heart:    Normal S1 S2, Slight murmur. no rubs, no gallops  Abdomen:   Soft, non tender, no organomegalies, positive bowel sounds  Extremities:   No edema, no cyanosis, good peripheral pulses  Neurological:   Awake, alert, oriented. No obvious focal deficits  Musculoskelatal:  No obvious deformities    Assessment:      Diagnosis Orders   1. Primary hypertension     2. ICD (implantable cardioverter-defibrillator) in place     as above  Cardiac fair for now     Plan:  No follow-ups on file. As above  Continue risk factor modification and medical management  Thank you for allowing me to participate in the care of your patient. Please don't hesitate to contact me regarding any further issues related to the patient care        Orders Placed:  No orders of the defined types were placed in this encounter. Medications Prescribed:  No orders of the defined types were placed in this encounter. Discussed use, benefit, and side effects of prescribed medications. All patient questions answered. Pt voicedunderstanding. Instructed to continue current medications, diet and exercise. Continue risk factor modification and medical management. Patient agreed with treatment plan. Follow up as directed.     Electronically signedby Yosef Good MD on 6/14/2022 at 2:50 PM

## 2022-08-15 ENCOUNTER — NURSE ONLY (OUTPATIENT)
Dept: CARDIOLOGY CLINIC | Age: 57
End: 2022-08-15
Payer: COMMERCIAL

## 2022-08-15 DIAGNOSIS — Z95.810 ICD (IMPLANTABLE CARDIOVERTER-DEFIBRILLATOR) IN PLACE: Primary | ICD-10-CM

## 2022-08-15 PROCEDURE — 93282 PRGRMG EVAL IMPLANTABLE DFB: CPT | Performed by: INTERNAL MEDICINE

## 2022-08-15 NOTE — PROGRESS NOTES
[place] sci sicd 3 mth advisory check in office   Battery 35%  Remaining    System impedence 95    No episodes    All vectors checked  Remains programmed primary with 1 x gain

## 2022-08-24 RX ORDER — CLOPIDOGREL BISULFATE 75 MG/1
TABLET ORAL
Qty: 90 TABLET | Refills: 3 | Status: SHIPPED | OUTPATIENT
Start: 2022-08-24

## 2022-08-24 RX ORDER — RAMIPRIL 5 MG/1
CAPSULE ORAL
Qty: 90 CAPSULE | Refills: 3 | Status: SHIPPED | OUTPATIENT
Start: 2022-08-24

## 2022-09-20 ENCOUNTER — PROCEDURE VISIT (OUTPATIENT)
Dept: CARDIOLOGY CLINIC | Age: 57
End: 2022-09-20

## 2022-09-20 DIAGNOSIS — Z95.810 ICD (IMPLANTABLE CARDIOVERTER-DEFIBRILLATOR) IN PLACE: Primary | ICD-10-CM

## 2022-10-24 ENCOUNTER — PROCEDURE VISIT (OUTPATIENT)
Dept: CARDIOLOGY CLINIC | Age: 57
End: 2022-10-24
Payer: COMMERCIAL

## 2022-10-24 DIAGNOSIS — Z95.810 ICD (IMPLANTABLE CARDIOVERTER-DEFIBRILLATOR) IN PLACE: Primary | ICD-10-CM

## 2022-10-24 PROCEDURE — 93296 REM INTERROG EVL PM/IDS: CPT | Performed by: INTERNAL MEDICINE

## 2022-10-24 PROCEDURE — 93295 DEV INTERROG REMOTE 1/2/MLT: CPT | Performed by: INTERNAL MEDICINE

## 2022-11-21 ENCOUNTER — NURSE ONLY (OUTPATIENT)
Dept: CARDIOLOGY CLINIC | Age: 57
End: 2022-11-21

## 2022-11-21 DIAGNOSIS — Z95.810 S/P ICD (INTERNAL CARDIAC DEFIBRILLATOR) PROCEDURE: Primary | ICD-10-CM

## 2022-11-21 NOTE — PROGRESS NOTES
Pt is here for a [place] [facility] sicd 3 mth device check in office    Battery remaining  31%  Impedence 85  No episodes       Could not get the printer to print anything out on this pt today

## 2022-12-28 ENCOUNTER — PROCEDURE VISIT (OUTPATIENT)
Dept: CARDIOLOGY CLINIC | Age: 57
End: 2022-12-28
Payer: COMMERCIAL

## 2022-12-28 DIAGNOSIS — Z95.810 S/P ICD (INTERNAL CARDIAC DEFIBRILLATOR) PROCEDURE: Primary | ICD-10-CM

## 2022-12-28 PROCEDURE — 93296 REM INTERROG EVL PM/IDS: CPT | Performed by: NUCLEAR MEDICINE

## 2022-12-28 PROCEDURE — 93295 DEV INTERROG REMOTE 1/2/MLT: CPT | Performed by: NUCLEAR MEDICINE

## 2023-02-03 PROCEDURE — 93295 DEV INTERROG REMOTE 1/2/MLT: CPT | Performed by: INTERNAL MEDICINE

## 2023-02-03 PROCEDURE — 93296 REM INTERROG EVL PM/IDS: CPT | Performed by: INTERNAL MEDICINE

## 2023-02-06 ENCOUNTER — PROCEDURE VISIT (OUTPATIENT)
Dept: CARDIOLOGY CLINIC | Age: 58
End: 2023-02-06
Payer: COMMERCIAL

## 2023-02-06 DIAGNOSIS — Z95.810 ICD (IMPLANTABLE CARDIOVERTER-DEFIBRILLATOR) IN PLACE: Primary | ICD-10-CM

## 2023-02-22 ENCOUNTER — TELEPHONE (OUTPATIENT)
Dept: CARDIOLOGY CLINIC | Age: 58
End: 2023-02-22

## 2023-02-24 RX ORDER — EVOLOCUMAB 140 MG/ML
140 INJECTION, SOLUTION SUBCUTANEOUS
Qty: 6 ADJUSTABLE DOSE PRE-FILLED PEN SYRINGE | Refills: 2 | Status: SHIPPED | OUTPATIENT
Start: 2023-02-24

## 2023-02-27 ENCOUNTER — NURSE ONLY (OUTPATIENT)
Dept: CARDIOLOGY CLINIC | Age: 58
End: 2023-02-27
Payer: COMMERCIAL

## 2023-02-27 DIAGNOSIS — Z95.810 ICD (IMPLANTABLE CARDIOVERTER-DEFIBRILLATOR) IN PLACE: Primary | ICD-10-CM

## 2023-02-27 PROCEDURE — 93261 INTERROGATE SUBQ DEFIB: CPT | Performed by: INTERNAL MEDICINE

## 2023-02-27 NOTE — PROGRESS NOTES
Dr Salvatore Patricia pt   [facility] sicd check in office   Battery remaining 28%    System impedence 100    Device checked in all vectors and is programmed primary with 1 x gain  No episodes

## 2023-03-14 ENCOUNTER — TELEPHONE (OUTPATIENT)
Dept: CARDIOLOGY CLINIC | Age: 58
End: 2023-03-14

## 2023-03-14 DIAGNOSIS — Z45.02 IMPLANTABLE CARDIOVERTER-DEFIBRILLATOR (ICD) GENERATOR END OF LIFE: Primary | ICD-10-CM

## 2023-03-14 NOTE — TELEPHONE ENCOUNTER
Procedure: Sicd battery change  Date: 03.20.2023  Arrival Time: 5am  Meds to Hold:  plavix 3 days    Instructions verbalized to the patient. Instructions mailed to the patient via [address]     Dr. Don Carrion please see medications-  do you agree?

## 2023-03-14 NOTE — TELEPHONE ENCOUNTER
Unscheduled [facility] SICD  Pt of [facility]    3/10/23-  Possible device malfunction ( Fault Code BD) triggered on device    Spoke with [place], [address] [facility] [address], per Mayetta Karval there is a recommended 90 day window for replacement ( by 6/12/23). Call to patients wife, Sandra Thomas (hippa), patient is at work. She knows patient would be agreeable to the gen change. Stated would work out great now before they weather gets nice since patient works in agriculture. Okay with Dr Saud Cintron doing gen change. Order placed in [employer] and given to [facility] for scheduling. Wife would like to be called since patient has a hard time getting to the phone at work.    [phone]

## 2023-03-15 NOTE — PRE-PROCEDURE INSTRUCTIONS
Called patient to remind him of upcoming gen replacement scheduled for Monday 3/20/23 arriving at 0500. Pt states he has received instructions and no further questions at this time.

## 2023-03-20 ENCOUNTER — HOSPITAL ENCOUNTER (OUTPATIENT)
Dept: INPATIENT UNIT | Age: 58
Discharge: HOME | End: 2023-03-20
Attending: INTERNAL MEDICINE | Admitting: INTERNAL MEDICINE
Payer: COMMERCIAL

## 2023-03-20 ENCOUNTER — APPOINTMENT (OUTPATIENT)
Dept: CARDIAC CATH/INVASIVE PROCEDURES | Age: 58
End: 2023-03-20
Payer: COMMERCIAL

## 2023-03-20 ENCOUNTER — ANESTHESIA EVENT (OUTPATIENT)
Dept: CARDIAC CATH/INVASIVE PROCEDURES | Age: 58
End: 2023-03-20
Payer: COMMERCIAL

## 2023-03-20 ENCOUNTER — ANESTHESIA (OUTPATIENT)
Dept: CARDIAC CATH/INVASIVE PROCEDURES | Age: 58
End: 2023-03-20
Payer: COMMERCIAL

## 2023-03-20 VITALS
HEART RATE: 56 BPM | SYSTOLIC BLOOD PRESSURE: 120 MMHG | TEMPERATURE: 95.8 F | RESPIRATION RATE: 12 BRPM | WEIGHT: 213 LBS | BODY MASS INDEX: 28.85 KG/M2 | OXYGEN SATURATION: 99 % | DIASTOLIC BLOOD PRESSURE: 70 MMHG | HEIGHT: 72 IN

## 2023-03-20 LAB
ANION GAP SERPL CALC-SCNC: 8 MEQ/L (ref 8–16)
APTT PPP: 28.1 SECONDS (ref 22–38)
BASOPHILS ABSOLUTE: 0 THOU/MM3 (ref 0–0.1)
BASOPHILS NFR BLD AUTO: 0.9 %
BUN SERPL-MCNC: 13 MG/DL (ref 7–22)
CALCIUM SERPL-MCNC: 8.7 MG/DL (ref 8.5–10.5)
CHLORIDE SERPL-SCNC: 106 MEQ/L (ref 98–111)
CO2 SERPL-SCNC: 25 MEQ/L (ref 23–33)
CREAT SERPL-MCNC: 0.7 MG/DL (ref 0.4–1.2)
DEPRECATED RDW RBC AUTO: 39.5 FL (ref 35–45)
EKG ATRIAL RATE: 54 BPM
EKG P AXIS: -25 DEGREES
EKG P-R INTERVAL: 146 MS
EKG Q-T INTERVAL: 406 MS
EKG QRS DURATION: 104 MS
EKG QTC CALCULATION (BAZETT): 385 MS
EKG R AXIS: 66 DEGREES
EKG T AXIS: 63 DEGREES
EKG VENTRICULAR RATE: 54 BPM
EOSINOPHIL NFR BLD AUTO: 3.1 %
EOSINOPHILS ABSOLUTE: 0.1 THOU/MM3 (ref 0–0.4)
ERYTHROCYTE [DISTWIDTH] IN BLOOD BY AUTOMATED COUNT: 12.2 % (ref 11.5–14.5)
GFR SERPL CREATININE-BSD FRML MDRD: > 60 ML/MIN/1.73M2
GLUCOSE SERPL-MCNC: 136 MG/DL (ref 70–108)
HCT VFR BLD AUTO: 42.7 % (ref 42–52)
HGB BLD-MCNC: 14.2 GM/DL (ref 14–18)
IMM GRANULOCYTES # BLD AUTO: 0.01 THOU/MM3 (ref 0–0.07)
IMM GRANULOCYTES NFR BLD AUTO: 0.2 %
INR PPP: 1.04 (ref 0.85–1.13)
LYMPHOCYTES ABSOLUTE: 1.4 THOU/MM3 (ref 1–4.8)
LYMPHOCYTES NFR BLD AUTO: 32.1 %
MCH RBC QN AUTO: 29.4 PG (ref 26–33)
MCHC RBC AUTO-ENTMCNC: 33.3 GM/DL (ref 32.2–35.5)
MCV RBC AUTO: 88.4 FL (ref 80–94)
MONOCYTES ABSOLUTE: 0.5 THOU/MM3 (ref 0.4–1.3)
MONOCYTES NFR BLD AUTO: 11.4 %
NEUTROPHILS NFR BLD AUTO: 52.3 %
NRBC BLD AUTO-RTO: 0 /100 WBC
PLATELET # BLD AUTO: 211 THOU/MM3 (ref 130–400)
PMV BLD AUTO: 9.3 FL (ref 9.4–12.4)
POTASSIUM SERPL-SCNC: 4.2 MEQ/L (ref 3.5–5.2)
RBC # BLD AUTO: 4.83 MILL/MM3 (ref 4.7–6.1)
SEGMENTED NEUTROPHILS ABSOLUTE COUNT: 2.4 THOU/MM3 (ref 1.8–7.7)
SODIUM SERPL-SCNC: 139 MEQ/L (ref 135–145)
WBC # BLD AUTO: 4.5 THOU/MM3 (ref 4.8–10.8)

## 2023-03-20 PROCEDURE — C1722 AICD, SINGLE CHAMBER: HCPCS

## 2023-03-20 PROCEDURE — 6360000002 HC RX W HCPCS: Performed by: NURSE ANESTHETIST, CERTIFIED REGISTERED

## 2023-03-20 PROCEDURE — 93010 ELECTROCARDIOGRAM REPORT: CPT | Performed by: INTERNAL MEDICINE

## 2023-03-20 PROCEDURE — 6360000002 HC RX W HCPCS

## 2023-03-20 PROCEDURE — 85025 COMPLETE CBC W/AUTO DIFF WBC: CPT

## 2023-03-20 PROCEDURE — 2500000003 HC RX 250 WO HCPCS: Performed by: NURSE ANESTHETIST, CERTIFIED REGISTERED

## 2023-03-20 PROCEDURE — 33262 RMVL& REPLC PULSE GEN 1 LEAD: CPT

## 2023-03-20 PROCEDURE — 7100000000 HC PACU RECOVERY - FIRST 15 MIN

## 2023-03-20 PROCEDURE — 2580000003 HC RX 258: Performed by: INTERNAL MEDICINE

## 2023-03-20 PROCEDURE — 7100000001 HC PACU RECOVERY - ADDTL 15 MIN

## 2023-03-20 PROCEDURE — 36415 COLL VENOUS BLD VENIPUNCTURE: CPT

## 2023-03-20 PROCEDURE — 33262 RMVL& REPLC PULSE GEN 1 LEAD: CPT | Performed by: INTERNAL MEDICINE

## 2023-03-20 PROCEDURE — 80048 BASIC METABOLIC PNL TOTAL CA: CPT

## 2023-03-20 PROCEDURE — 85610 PROTHROMBIN TIME: CPT

## 2023-03-20 PROCEDURE — 3700000001 HC ADD 15 MINUTES (ANESTHESIA)

## 2023-03-20 PROCEDURE — 85730 THROMBOPLASTIN TIME PARTIAL: CPT

## 2023-03-20 PROCEDURE — 93005 ELECTROCARDIOGRAM TRACING: CPT | Performed by: INTERNAL MEDICINE

## 2023-03-20 PROCEDURE — 3700000000 HC ANESTHESIA ATTENDED CARE

## 2023-03-20 PROCEDURE — 2500000003 HC RX 250 WO HCPCS

## 2023-03-20 PROCEDURE — 6360000002 HC RX W HCPCS: Performed by: INTERNAL MEDICINE

## 2023-03-20 PROCEDURE — 2580000003 HC RX 258: Performed by: NURSE ANESTHETIST, CERTIFIED REGISTERED

## 2023-03-20 RX ORDER — SODIUM CHLORIDE 9 MG/ML
INJECTION, SOLUTION INTRAVENOUS CONTINUOUS
Status: DISCONTINUED | OUTPATIENT
Start: 2023-03-20 | End: 2023-03-20 | Stop reason: HOSPADM

## 2023-03-20 RX ORDER — RAMIPRIL 5 MG/1
5 CAPSULE ORAL DAILY
COMMUNITY

## 2023-03-20 RX ORDER — VASOPRESSIN 20 U/ML
INJECTION PARENTERAL PRN
Status: DISCONTINUED | OUTPATIENT
Start: 2023-03-20 | End: 2023-03-20 | Stop reason: SDUPTHER

## 2023-03-20 RX ORDER — MIDAZOLAM HYDROCHLORIDE 1 MG/ML
INJECTION INTRAMUSCULAR; INTRAVENOUS PRN
Status: DISCONTINUED | OUTPATIENT
Start: 2023-03-20 | End: 2023-03-20 | Stop reason: SDUPTHER

## 2023-03-20 RX ORDER — CEPHALEXIN 500 MG/1
500 CAPSULE ORAL 3 TIMES DAILY
Qty: 21 CAPSULE | Refills: 0 | Status: SHIPPED | OUTPATIENT
Start: 2023-03-20 | End: 2023-03-27

## 2023-03-20 RX ORDER — LIDOCAINE HYDROCHLORIDE 20 MG/ML
INJECTION, SOLUTION INTRAVENOUS PRN
Status: DISCONTINUED | OUTPATIENT
Start: 2023-03-20 | End: 2023-03-20 | Stop reason: SDUPTHER

## 2023-03-20 RX ORDER — SODIUM CHLORIDE 0.9 % (FLUSH) 0.9 %
5-40 SYRINGE (ML) INJECTION EVERY 12 HOURS SCHEDULED
Status: DISCONTINUED | OUTPATIENT
Start: 2023-03-20 | End: 2023-03-20 | Stop reason: HOSPADM

## 2023-03-20 RX ORDER — ONDANSETRON 2 MG/ML
4 INJECTION INTRAMUSCULAR; INTRAVENOUS
Status: CANCELLED | OUTPATIENT
Start: 2023-03-20 | End: 2023-03-21

## 2023-03-20 RX ORDER — SODIUM CHLORIDE 0.9 % (FLUSH) 0.9 %
5-40 SYRINGE (ML) INJECTION PRN
Status: DISCONTINUED | OUTPATIENT
Start: 2023-03-20 | End: 2023-03-20 | Stop reason: HOSPADM

## 2023-03-20 RX ORDER — FENTANYL CITRATE 50 UG/ML
50 INJECTION, SOLUTION INTRAMUSCULAR; INTRAVENOUS EVERY 5 MIN PRN
OUTPATIENT
Start: 2023-03-20

## 2023-03-20 RX ORDER — SODIUM CHLORIDE 0.9 % (FLUSH) 0.9 %
5-40 SYRINGE (ML) INJECTION PRN
OUTPATIENT
Start: 2023-03-20

## 2023-03-20 RX ORDER — DIPHENHYDRAMINE HYDROCHLORIDE 50 MG/ML
12.5 INJECTION INTRAMUSCULAR; INTRAVENOUS
Status: CANCELLED | OUTPATIENT
Start: 2023-03-20 | End: 2023-03-21

## 2023-03-20 RX ORDER — PROPOFOL 10 MG/ML
INJECTION, EMULSION INTRAVENOUS PRN
Status: DISCONTINUED | OUTPATIENT
Start: 2023-03-20 | End: 2023-03-20 | Stop reason: SDUPTHER

## 2023-03-20 RX ORDER — SODIUM CHLORIDE 9 MG/ML
INJECTION, SOLUTION INTRAVENOUS CONTINUOUS PRN
Status: DISCONTINUED | OUTPATIENT
Start: 2023-03-20 | End: 2023-03-20 | Stop reason: SDUPTHER

## 2023-03-20 RX ORDER — SODIUM CHLORIDE 9 MG/ML
INJECTION, SOLUTION INTRAVENOUS PRN
OUTPATIENT
Start: 2023-03-20

## 2023-03-20 RX ORDER — FENTANYL CITRATE 50 UG/ML
INJECTION, SOLUTION INTRAMUSCULAR; INTRAVENOUS PRN
Status: DISCONTINUED | OUTPATIENT
Start: 2023-03-20 | End: 2023-03-20 | Stop reason: SDUPTHER

## 2023-03-20 RX ORDER — SODIUM CHLORIDE 0.9 % (FLUSH) 0.9 %
5-40 SYRINGE (ML) INJECTION EVERY 12 HOURS SCHEDULED
OUTPATIENT
Start: 2023-03-20

## 2023-03-20 RX ORDER — ONDANSETRON 2 MG/ML
INJECTION INTRAMUSCULAR; INTRAVENOUS PRN
Status: DISCONTINUED | OUTPATIENT
Start: 2023-03-20 | End: 2023-03-20 | Stop reason: SDUPTHER

## 2023-03-20 RX ADMIN — SODIUM CHLORIDE: 9 INJECTION, SOLUTION INTRAVENOUS at 05:53

## 2023-03-20 RX ADMIN — VASOPRESSIN 2 UNITS: 20 INJECTION INTRAVENOUS at 07:54

## 2023-03-20 RX ADMIN — MIDAZOLAM 2 MG: 1 INJECTION INTRAMUSCULAR; INTRAVENOUS at 07:10

## 2023-03-20 RX ADMIN — FENTANYL CITRATE 25 MCG: 50 INJECTION, SOLUTION INTRAMUSCULAR; INTRAVENOUS at 07:28

## 2023-03-20 RX ADMIN — ONDANSETRON 4 MG: 2 INJECTION INTRAMUSCULAR; INTRAVENOUS at 08:14

## 2023-03-20 RX ADMIN — Medication 2000 MG: at 06:27

## 2023-03-20 RX ADMIN — FENTANYL CITRATE 25 MCG: 50 INJECTION, SOLUTION INTRAMUSCULAR; INTRAVENOUS at 07:55

## 2023-03-20 RX ADMIN — SODIUM CHLORIDE: 9 INJECTION, SOLUTION INTRAVENOUS at 07:09

## 2023-03-20 RX ADMIN — Medication 2000 MG: at 07:24

## 2023-03-20 RX ADMIN — PROPOFOL 200 MG: 10 INJECTION, EMULSION INTRAVENOUS at 07:17

## 2023-03-20 RX ADMIN — FENTANYL CITRATE 25 MCG: 50 INJECTION, SOLUTION INTRAMUSCULAR; INTRAVENOUS at 07:23

## 2023-03-20 RX ADMIN — FENTANYL CITRATE 25 MCG: 50 INJECTION, SOLUTION INTRAMUSCULAR; INTRAVENOUS at 07:46

## 2023-03-20 RX ADMIN — LIDOCAINE HYDROCHLORIDE 100 MG: 20 INJECTION, SOLUTION INTRAVENOUS at 07:17

## 2023-03-20 RX ADMIN — VASOPRESSIN 2 UNITS: 20 INJECTION INTRAVENOUS at 08:26

## 2023-03-20 RX ADMIN — VASOPRESSIN 2 UNITS: 20 INJECTION INTRAVENOUS at 07:40

## 2023-03-20 NOTE — LETTER
3/20/2023        Krish U [address] [address]      RETURN TO WORK OR SCHOOL    To Whom It May Concern:    Nyla Horowitz, date of birth 1965, is under the care of [facility] [address]. He is released to return to work or school on 3- with the following restrictions on activity: No restrictions    Please have the patient contact our office if there are questions or concerns.       Sincerely,      [facility] ROOM [id number]

## 2023-03-20 NOTE — DISCHARGE INSTRUCTIONS
magnets of any kind       Cellular phones should be used on the ear opposite to your device. Do not keep cellular phones in a pocket over your defibrillator. To avoid any interference with your device, you must pass through any security devices or capps (airports, department stores, and other public places) within               10 seconds. Microwave ovens, televisions, electric tools, and gardening equipment should not cause problems. If you have any questions about equipment not listed, call the  of your device or the Pacemaker/ICD clinic at [address] [place] [address]. General instructions      Always carry your device identification card with you. The device   will mail a permanent  identification card to you in 6-8 weeks. Keep your follow-up appointment with your doctor and the device clinic to be sure your device and the leads are working properly. Always take your medications at the times prescribed by your doctor. Always tell  medical personnel that you have a device. Memorize the name of the [address] [place] [address], [address]      Follow-up appointments:     Make a follow-up appointment with you doctor 1-2 weeks after your implantation.       If you have any problems or questions about your device, call the Pacemaker/ICD Clinic [phone]     Clinic hours Monday through Friday 8 am to 4 pm  IN CASE OF AN EMERGENCY, CALL 350 [de-identified] : Left knee shows no warmth or swelling there is full range of motion. There is no crepitus no instability pain medially neurovascular exam is normal

## 2023-03-20 NOTE — BRIEF OP NOTE
Brief Postoperative Note    Date:   3/20/2023  Patient name: Radha Johnson  YOB: 1965  Sex: male   MRN:   [id number]    PCP: Kirsty Mckay MD     Procedure: S-ICD generator change. Pre-Op Diagnosis: S-ICD at advisory for premature battery depletion. Post-Op Diagnosis: Same    Surgeon: Ava Helton MD, [facility], [place], [place]    Assistant: Zoraida Williamson. Anesthesia/sedation:  Local lidocaine/fentanyl and midazolam as needed. Estimated Blood Loss (mL): Minimal    Complications: None    Recommendations:  See Epic orders. Bed rest for 2 hours. Keep pocket site dry. No shower for 7 days ((sponge bath and tub bath OK). ICE packs locally. Monitor site for bleeding or swelling. Pressure dressing x 24 hours. Keflex 500 mg tid x 5 days. Follow up in device clinic in 1 week.        Electronically signed by Claudene Moody, MD, Mami Rojas on 3/20/2023 at 8:53 AM

## 2023-03-20 NOTE — PLAN OF CARE
1133  Out of bed   Discharge instructions given to pt and spouse, both \"voiced understanding\"    \"Ready to go home\"    Denies discomfort   1200  discharged per wc per transport team with personal belongings with no needs

## 2023-03-20 NOTE — PROGRESS NOTES
8915- pt to pacu, oral airway present, VSS, pt appears in no acute distress  0855- vendor finished with optimizing device  5445- oral airway removed  0915- pt resting in bed, resp easy and unlabored, VSS, pt denies pain and nausea, pt appears in no acute distress  0920- Report called to Krystyna Garcia RN on 2E  8197- pt meets criteria for discharge from pacu, pt transported to 2E by OR staff

## 2023-03-20 NOTE — PROGRESS NOTES
[facility] [place]pt: [phone]  ELECTROPHYSIOLOGY PROCEDURE NOTE  Patients demographics:  Date:   3/20/2023  Patient name:              Joanna Gonzalez  YOB: 1965  Sex: male   MRN:   [id number]    Primary care provider    Carmon Caller, MD     Cardiologist:  Andrew Flores MD    Procedure planned:  Subcutaneous implantable cardioverter defibrillator (S-ICD) generator change. Indications for the procedure:  S-ICD pulse generator on advisory for premature battery depletion. Brief clinical summary:  Anushka Larson is a 59years old gentleman with S-ICD implanted for severe nonischemic cardiomyopathy (resolved, LVF 57%) in 11/2016 by Dr. Sandra Medina at [facility] for primary prevention of sudden cardiac death. His pulse generator is on advisory for premature battery depletion and generator change recommended by the device company. No history of syncope or ventricular arrhythmia. Medical Hx: Nonobstructive CAD and HTN     Procedure performed:  S-ICD pulse generator change. Fluoroscopy of the device. .     Procedure details: The patient was brought to the electrophysiology lab in a fasting and non-sedated state. The S-ICD was interrogated. The shock impedance was noted to be around 95 ohms. Fluoroscopy of the chest showed appropriate locations of DF electrode and pulse generator. The patient was intubated and general anesthesia administered by anesthesiology service. He was prepped and draped in the sterile fashion using 4 piece surgical drape. The skin over the pulse generator was liberally infiltrated with 2% lidocaine for local anesthesia. A 5 cm long incision was made along old incisional scar in left mid axillary line. The pocket capsule was opened and pulse generator was taken out of the pocket. The bleeding points were secured by electrocautery.  The defibrillator electrode was disconnected from the old pulse

## 2023-03-20 NOTE — ANESTHESIA POSTPROCEDURE EVALUATION
Department of Anesthesiology  Postprocedure Note    Patient: Sergo Serrano  MRN: [id number]  YOB: 1965  Date of evaluation: 3/20/2023      Procedure Summary     Date: 03/20/23 Room / Location: [facility] CATH LAB    Anesthesia Start: 0709 Anesthesia Stop: Bria Anil    Procedure: STR PACEMAKER W/ ANESTHESIA Diagnosis:     Scheduled Providers:  Responsible Provider: Jaqueline Fletcher DO    Anesthesia Type: General ASA Status: 3          Anesthesia Type: General    Andria Phase I: Andria Score: 10    Andria Phase II:        Anesthesia Post Evaluation    Patient location during evaluation: PACU  Patient participation: complete - patient participated  Level of consciousness: awake and alert  Airway patency: patent  Nausea & Vomiting: no vomiting and no nausea  Complications: no  Cardiovascular status: hemodynamically stable  Respiratory status: acceptable  Hydration status: stable

## 2023-03-20 NOTE — PROGRESS NOTES
0500  Pt admitted to  2E12 ambulatory for SICD generator replacement . Pt NPO. Patient accompanied by spouse. Vital signs obtained. Assessment and data collection initiated. Oriented to room. Policies and procedures for 2E explained   All questions answered with no further questions at this time. Fall prevention and safety precautions discussed with patient. Care plan reviewed with patient and spouse. Patient and spouse verbalize understanding of the plan of care and contribute to goal setting.      26  Dr Fozia Damon in to see pt and spouse   To procedure per bed

## 2023-03-30 ENCOUNTER — NURSE ONLY (OUTPATIENT)
Dept: CARDIOLOGY CLINIC | Age: 58
End: 2023-03-30
Payer: COMMERCIAL

## 2023-03-30 DIAGNOSIS — Z95.810 ICD (IMPLANTABLE CARDIOVERTER-DEFIBRILLATOR) IN PLACE: Primary | ICD-10-CM

## 2023-03-30 PROCEDURE — 93289 INTERROG DEVICE EVAL HEART: CPT | Performed by: INTERNAL MEDICINE

## 2023-03-30 NOTE — PROGRESS NOTES
In [address]    Gen change 3/20/23 for Fault Code BD    Battery 100%    Impedence 95    Episodes   None

## 2023-06-20 ENCOUNTER — OFFICE VISIT (OUTPATIENT)
Dept: CARDIOLOGY CLINIC | Age: 58
End: 2023-06-20
Payer: COMMERCIAL

## 2023-06-20 VITALS
SYSTOLIC BLOOD PRESSURE: 160 MMHG | BODY MASS INDEX: 30.66 KG/M2 | HEART RATE: 74 BPM | HEIGHT: 71 IN | WEIGHT: 219 LBS | DIASTOLIC BLOOD PRESSURE: 78 MMHG

## 2023-06-20 DIAGNOSIS — I10 PRIMARY HYPERTENSION: ICD-10-CM

## 2023-06-20 DIAGNOSIS — Z95.810 ICD (IMPLANTABLE CARDIOVERTER-DEFIBRILLATOR) IN PLACE: Primary | ICD-10-CM

## 2023-06-20 PROCEDURE — 99213 OFFICE O/P EST LOW 20 MIN: CPT | Performed by: NUCLEAR MEDICINE

## 2023-06-20 PROCEDURE — 3078F DIAST BP <80 MM HG: CPT | Performed by: NUCLEAR MEDICINE

## 2023-06-20 PROCEDURE — 3077F SYST BP >= 140 MM HG: CPT | Performed by: NUCLEAR MEDICINE

## 2023-06-20 NOTE — PROGRESS NOTES
[address] [address] [address] [facility],[address] Floor [address] [facility]  Dept: [phone]  Dept Fax: [phone]  Loc: [phone]    Visit Date: 6/20/2023    Yamil Handy is a 59 y.o. male who presents todayfor:  Chief Complaint   Patient presents with    Follow-up     1 year     Hypertension    Cardiomyopathy     Had ICD change out   Had ICD for SCD  No chest pain  No changes in breathing  On repatha  No dizziness  No syncope  BP is stable   Higher today     HPI:  HPI  Past Medical History:   Diagnosis Date    CAD (coronary artery disease)     CHF (congestive heart failure) ([facility])     Chronic kidney disease     Coronary artery disease involving native coronary artery of native heart with angina pectoris ([facility] 9/6/2016    Hypertension     Hypoxic brain injury ([facility] [address] 08/04/2016    Cardiac arrest for 10 minutes    life vest 8/30/2016    NSTEMI (non-ST elevated myocardial infarction) ([facility] [address] 10/5/2016    Pneumonia     Precordial pain 9/6/2016    S/P cardiac catheterization: 8/8/2016: LM moderately calcified with no significant obstruction, LAD diffuse long segmental narrowing of about 70-75%, LCX OK, RCA OK. LVEF 40-45%. LVEDP 26 mmHg. 8/8/2016 8/8/2016: LM moderately calcified with no significant obstruction, LAD diffuse long segmental narrowing of about 70-75%, LCX OK, RCA OK. LVEF 40-45%. LVEDP 26 mmHg.  Dr. Bettyann Goldberg    S/P ICD (internal cardiac defibrillator) procedure: SICD placed by Dr. Mikhail Booth October 11/2016 11/16/2016      Past Surgical History:   Procedure Laterality Date    COLONOSCOPY  03/2016    HERNIA REPAIR  01/01/2004    HERNIA REPAIR  2006    PACEMAKER PLACEMENT      WISDOM TOOTH EXTRACTION Bilateral      Family History   Problem Relation Age of Onset    Heart Disease Mother     Asthma Mother     Diabetes Mother     High Blood Pressure Mother     High Cholesterol Mother     Diabetes Father     Heart Disease Maternal Grandmother      Social History

## 2023-06-28 PROCEDURE — 93295 DEV INTERROG REMOTE 1/2/MLT: CPT | Performed by: NUCLEAR MEDICINE

## 2023-06-28 PROCEDURE — 93296 REM INTERROG EVL PM/IDS: CPT | Performed by: NUCLEAR MEDICINE

## 2023-06-29 ENCOUNTER — PROCEDURE VISIT (OUTPATIENT)
Dept: CARDIOLOGY CLINIC | Age: 58
End: 2023-06-29
Payer: COMMERCIAL

## 2023-06-29 DIAGNOSIS — Z95.810 S/P ICD (INTERNAL CARDIAC DEFIBRILLATOR) PROCEDURE: Primary | ICD-10-CM

## 2023-08-04 ENCOUNTER — NURSE ONLY (OUTPATIENT)
Dept: CARDIOLOGY CLINIC | Age: 58
End: 2023-08-04

## 2023-08-04 DIAGNOSIS — Z95.810 ICD (IMPLANTABLE CARDIOVERTER-DEFIBRILLATOR) IN PLACE: Primary | ICD-10-CM

## 2023-08-04 NOTE — PROGRESS NOTES
[employer] [employer] sicd brought into the office today for the [employer] update for transient sensing behavior      Software updated    Battery 97% remaining    System impedence 105    Device checked in all 3 vectors and pt is programmed primary with 1 x gain and no episodes

## 2023-08-25 RX ORDER — CLOPIDOGREL BISULFATE 75 MG/1
TABLET ORAL
Qty: 90 TABLET | Refills: 3 | Status: SHIPPED | OUTPATIENT
Start: 2023-08-25

## 2023-08-25 RX ORDER — RAMIPRIL 5 MG/1
5 CAPSULE ORAL DAILY
Qty: 90 CAPSULE | Refills: 0 | Status: SHIPPED | OUTPATIENT
Start: 2023-08-25 | End: 2023-11-23

## 2023-08-31 RX ORDER — RAMIPRIL 5 MG/1
5 CAPSULE ORAL DAILY
Qty: 90 CAPSULE | Refills: 3 | Status: SHIPPED | OUTPATIENT
Start: 2023-08-31 | End: 2023-11-29

## 2023-08-31 RX ORDER — CLOPIDOGREL BISULFATE 75 MG/1
TABLET ORAL
Qty: 90 TABLET | Refills: 3 | Status: SHIPPED | OUTPATIENT
Start: 2023-08-31

## 2023-10-06 ENCOUNTER — PROCEDURE VISIT (OUTPATIENT)
Dept: CARDIOLOGY CLINIC | Age: 58
End: 2023-10-06

## 2023-10-06 DIAGNOSIS — Z95.810 S/P ICD (INTERNAL CARDIAC DEFIBRILLATOR) PROCEDURE: Primary | ICD-10-CM

## 2024-01-15 ENCOUNTER — PROCEDURE VISIT (OUTPATIENT)
Dept: CARDIOLOGY CLINIC | Age: 59
End: 2024-01-15
Payer: COMMERCIAL

## 2024-01-15 DIAGNOSIS — Z95.810 S/P ICD (INTERNAL CARDIAC DEFIBRILLATOR) PROCEDURE: Primary | ICD-10-CM

## 2024-01-15 PROCEDURE — 93296 REM INTERROG EVL PM/IDS: CPT | Performed by: NUCLEAR MEDICINE

## 2024-01-15 PROCEDURE — 93295 DEV INTERROG REMOTE 1/2/MLT: CPT | Performed by: NUCLEAR MEDICINE

## 2024-01-15 NOTE — PROGRESS NOTES
Remote [place] [employer] SICD    Pt of Baki    Battery 92%    RV impedance 110    Episodes none

## 2024-04-15 ENCOUNTER — TELEPHONE (OUTPATIENT)
Dept: CARDIOLOGY CLINIC | Age: 59
End: 2024-04-15

## 2024-04-15 DIAGNOSIS — I10 PRIMARY HYPERTENSION: ICD-10-CM

## 2024-04-15 DIAGNOSIS — E78.00 PURE HYPERCHOLESTEROLEMIA: Primary | ICD-10-CM

## 2024-04-15 DIAGNOSIS — Z95.810 S/P ICD (INTERNAL CARDIAC DEFIBRILLATOR) PROCEDURE: ICD-10-CM

## 2024-04-15 NOTE — TELEPHONE ENCOUNTER
Received a fax from [employer] that pt is needing recent labs and recent office visit faxed to [employer].  No recent Lipid.    Spoke with pt.  Pt will get labs drawn at .  Once we receive labs - please fax lab and office note to [phone]

## 2024-04-17 PROCEDURE — 93296 REM INTERROG EVL PM/IDS: CPT | Performed by: NUCLEAR MEDICINE

## 2024-04-17 PROCEDURE — 93295 DEV INTERROG REMOTE 1/2/MLT: CPT | Performed by: NUCLEAR MEDICINE

## 2024-04-17 NOTE — TELEPHONE ENCOUNTER
Spoke to patient's wife on HIPAA.  Wife states patient will have lab drawn tomorrow morning at .  Lipid results and last office note need faxed to [phone]

## 2024-04-18 ENCOUNTER — PROCEDURE VISIT (OUTPATIENT)
Dept: CARDIOLOGY CLINIC | Age: 59
End: 2024-04-18
Payer: COMMERCIAL

## 2024-04-18 DIAGNOSIS — Z95.810 ICD (IMPLANTABLE CARDIOVERTER-DEFIBRILLATOR) IN PLACE: Primary | ICD-10-CM

## 2024-04-18 NOTE — PROGRESS NOTES
Remote [employer] SICD  Pt of Baki    Battery 89%    Impedence 105    Episodes   none

## 2024-04-19 NOTE — TELEPHONE ENCOUNTER
Call to [facility], labs not drawn at this time. Last lab draw January.   Call to patient, spoke to his wife, will have it drawn Saturday morning.

## 2024-04-20 LAB
CHOLESTEROL, TOTAL: 93 MG/DL
CHOLESTEROL/HDL RATIO: NORMAL
HDLC SERPL-MCNC: 42 MG/DL (ref 35–70)
LDL CHOLESTEROL CALCULATED: 26 MG/DL (ref 0–160)
NONHDLC SERPL-MCNC: NORMAL MG/DL
TRIGL SERPL-MCNC: 124 MG/DL
VLDLC SERPL CALC-MCNC: 25 MG/DL

## 2024-06-18 ENCOUNTER — OFFICE VISIT (OUTPATIENT)
Dept: CARDIOLOGY CLINIC | Age: 59
End: 2024-06-18
Payer: COMMERCIAL

## 2024-06-18 VITALS
HEART RATE: 78 BPM | DIASTOLIC BLOOD PRESSURE: 75 MMHG | BODY MASS INDEX: 29.93 KG/M2 | WEIGHT: 213.8 LBS | HEIGHT: 71 IN | SYSTOLIC BLOOD PRESSURE: 145 MMHG

## 2024-06-18 DIAGNOSIS — I10 PRIMARY HYPERTENSION: Primary | ICD-10-CM

## 2024-06-18 DIAGNOSIS — E78.01 FAMILIAL HYPERCHOLESTEROLEMIA: ICD-10-CM

## 2024-06-18 PROCEDURE — 3078F DIAST BP <80 MM HG: CPT | Performed by: NUCLEAR MEDICINE

## 2024-06-18 PROCEDURE — 99213 OFFICE O/P EST LOW 20 MIN: CPT | Performed by: NUCLEAR MEDICINE

## 2024-06-18 PROCEDURE — 3077F SYST BP >= 140 MM HG: CPT | Performed by: NUCLEAR MEDICINE

## 2024-06-18 RX ORDER — VALSARTAN 80 MG/1
80 TABLET ORAL DAILY
Qty: 90 TABLET | Refills: 3 | Status: SHIPPED | OUTPATIENT
Start: 2024-06-18

## 2024-06-18 RX ORDER — CLOPIDOGREL BISULFATE 75 MG/1
TABLET ORAL
Qty: 90 TABLET | Refills: 3 | Status: SHIPPED | OUTPATIENT
Start: 2024-06-18

## 2024-06-18 RX ORDER — VALSARTAN 80 MG/1
80 TABLET ORAL DAILY
COMMUNITY
End: 2024-06-18 | Stop reason: SDUPTHER

## 2024-06-18 RX ORDER — EVOLOCUMAB 140 MG/ML
INJECTION, SOLUTION SUBCUTANEOUS
Qty: 6 ADJUSTABLE DOSE PRE-FILLED PEN SYRINGE | Refills: 3 | Status: SHIPPED | OUTPATIENT
Start: 2024-06-18

## 2024-06-18 RX ORDER — RAMIPRIL 5 MG/1
5 CAPSULE ORAL DAILY
Qty: 90 CAPSULE | Refills: 0 | Status: CANCELLED | OUTPATIENT
Start: 2024-06-18 | End: 2024-09-16

## 2024-06-18 RX ORDER — IPRATROPIUM BROMIDE AND ALBUTEROL SULFATE 2.5; .5 MG/3ML; MG/3ML
1 SOLUTION RESPIRATORY (INHALATION) EVERY 4 HOURS
Qty: 30 EACH | Refills: 0 | Status: SHIPPED | OUTPATIENT
Start: 2024-06-18

## 2024-06-18 NOTE — PROGRESS NOTES
Pt C/O pt has no cardiac symptoms       Pt denies CP, SOB, Headache, dizziness, heart palpitations, swelling, fatigue  
B vaccine  Aged Out    Hib vaccine  Aged Out    Polio vaccine  Aged Out    Meningococcal (ACWY) vaccine  Aged Out    Pneumococcal 0-64 years Vaccine  Aged Out       Subjective:  General:   No fever, no chills, No fatigue or weight loss  Pulmonary:    No dyspnea, no wheezing  Cardiac:    Denies recent chest pain,   GI:     No nausea or vomiting, no abdominal pain  Neuro:    No dizziness or light headedness,   Musculoskeletal:  No recent active issues  Extremities:   No edema, no obvious claudication       Objective:  General:   Well developed, well nourished  Lungs:   Clear to auscultation  Heart:    Normal S1 S2, Slight murmur. no rubs, no gallops  Abdomen:   Soft, non tender, no organomegalies, positive bowel sounds  Extremities:   No edema, no cyanosis, good peripheral pulses  Neurological:   Awake, alert, oriented. No obvious focal deficits  Musculoskelatal:  No obvious deformities   BP (!) 145/75   Pulse 78   Ht 1.803 m (5' 11\")   Wt 97 kg (213 lb 12.8 oz)   BMI 29.82 kg/m²     Assessment:  Assessment & Plan    Diagnosis Orders   1. Primary hypertension        2. Familial hypercholesterolemia        As above  Cardiac fair for   Likely ACEI cough     Plan:  No follow-ups on file.  Change to diovan 80 mg   Monitor the BP  Continue risk factor modification and medical management  Thank you for allowing me to participate in the care of your patient. Please don't hesitate to contact me regarding any further issues related to the patient care    Orders Placed:  No orders of the defined types were placed in this encounter.      Prescribed:  No orders of the defined types were placed in this encounter.         Discussed use, benefit, and side effects of prescribed medications. All patient questions answered. Pt voicedunderstanding. Instructed to continue current medications, diet and exercise. Continue risk factor modification and medical management. Patient agreed with treatment plan. Follow up as

## 2024-06-19 ENCOUNTER — TELEPHONE (OUTPATIENT)
Dept: CARDIOLOGY CLINIC | Age: 59
End: 2024-06-19

## 2024-06-19 NOTE — TELEPHONE ENCOUNTER
Please see My Chart Message:     Krish RODRIGUEZ [facility] Clinical Staff (supporting Marjorie Mckeon MD)12 hours ago (6:58 PM)     MH  In my appt we discussed a script for albuterol ampules-I have a cough and breathing treatments help greatly. I was told it would be called in to [facility].

## 2024-07-26 ENCOUNTER — PROCEDURE VISIT (OUTPATIENT)
Dept: CARDIOLOGY CLINIC | Age: 59
End: 2024-07-26

## 2024-07-26 DIAGNOSIS — Z95.810 ICD (IMPLANTABLE CARDIOVERTER-DEFIBRILLATOR) IN PLACE: Primary | ICD-10-CM

## 2024-07-26 NOTE — PROGRESS NOTES
DR VELAZCO PT   [employer] SICD REMOTE   BATTERY 86% REMAINING  NO EPISODES  SYSTEM IMPEDENCE 95    PRIMARY WITH 1 X GAIN

## 2024-10-05 ENCOUNTER — PATIENT MESSAGE (OUTPATIENT)
Dept: CARDIOLOGY CLINIC | Age: 59
End: 2024-10-05

## 2024-10-29 PROCEDURE — 93296 REM INTERROG EVL PM/IDS: CPT | Performed by: NUCLEAR MEDICINE

## 2024-10-29 PROCEDURE — 93295 DEV INTERROG REMOTE 1/2/MLT: CPT | Performed by: NUCLEAR MEDICINE

## 2024-12-31 RX ORDER — EVOLOCUMAB 140 MG/ML
INJECTION, SOLUTION SUBCUTANEOUS
Qty: 6 ADJUSTABLE DOSE PRE-FILLED PEN SYRINGE | Refills: 3 | Status: SHIPPED | OUTPATIENT
Start: 2024-12-31

## 2025-01-03 RX ORDER — METOPROLOL TARTRATE 25 MG/1
TABLET, FILM COATED ORAL
Qty: 180 TABLET | Refills: 2 | Status: SHIPPED | OUTPATIENT
Start: 2025-01-03

## 2025-01-03 RX ORDER — CLOPIDOGREL BISULFATE 75 MG/1
TABLET ORAL
Qty: 90 TABLET | Refills: 2 | Status: SHIPPED | OUTPATIENT
Start: 2025-01-03

## 2025-01-28 PROCEDURE — 93295 DEV INTERROG REMOTE 1/2/MLT: CPT | Performed by: NUCLEAR MEDICINE

## 2025-01-28 PROCEDURE — 93296 REM INTERROG EVL PM/IDS: CPT | Performed by: NUCLEAR MEDICINE

## 2025-04-21 ENCOUNTER — TELEPHONE (OUTPATIENT)
Dept: CARDIOLOGY CLINIC | Age: 60
End: 2025-04-21

## 2025-04-23 NOTE — TELEPHONE ENCOUNTER
ALICIA REYES (Heredia: [id number]) - [id number]  Repatha SureClick 140MG/ML auto-injectors  outcome: ApprovedCreated: April 21st, 2025 [id number]: April 21st, 2025    I called [facility] and spoke to Luci and she is aware of approval.

## 2025-06-17 ENCOUNTER — OFFICE VISIT (OUTPATIENT)
Dept: CARDIOLOGY CLINIC | Age: 60
End: 2025-06-17
Payer: COMMERCIAL

## 2025-06-17 ENCOUNTER — TELEPHONE (OUTPATIENT)
Dept: CARDIOLOGY CLINIC | Age: 60
End: 2025-06-17

## 2025-06-17 VITALS
SYSTOLIC BLOOD PRESSURE: 150 MMHG | DIASTOLIC BLOOD PRESSURE: 80 MMHG | HEIGHT: 73 IN | HEART RATE: 72 BPM | BODY MASS INDEX: 28.94 KG/M2 | WEIGHT: 218.4 LBS

## 2025-06-17 DIAGNOSIS — I10 PRIMARY HYPERTENSION: Primary | ICD-10-CM

## 2025-06-17 DIAGNOSIS — I25.10 CORONARY ARTERY DISEASE INVOLVING NATIVE CORONARY ARTERY OF NATIVE HEART WITHOUT ANGINA PECTORIS: ICD-10-CM

## 2025-06-17 DIAGNOSIS — E78.01 FAMILIAL HYPERCHOLESTEROLEMIA: ICD-10-CM

## 2025-06-17 PROCEDURE — 99214 OFFICE O/P EST MOD 30 MIN: CPT | Performed by: NUCLEAR MEDICINE

## 2025-06-17 PROCEDURE — 3079F DIAST BP 80-89 MM HG: CPT | Performed by: NUCLEAR MEDICINE

## 2025-06-17 PROCEDURE — 3077F SYST BP >= 140 MM HG: CPT | Performed by: NUCLEAR MEDICINE

## 2025-06-17 PROCEDURE — G8419 CALC BMI OUT NRM PARAM NOF/U: HCPCS | Performed by: NUCLEAR MEDICINE

## 2025-06-17 PROCEDURE — G8427 DOCREV CUR MEDS BY ELIG CLIN: HCPCS | Performed by: NUCLEAR MEDICINE

## 2025-06-17 PROCEDURE — 4004F PT TOBACCO SCREEN RCVD TLK: CPT | Performed by: NUCLEAR MEDICINE

## 2025-06-17 PROCEDURE — 3017F COLORECTAL CA SCREEN DOC REV: CPT | Performed by: NUCLEAR MEDICINE

## 2025-06-17 RX ORDER — CLOPIDOGREL BISULFATE 75 MG/1
75 TABLET ORAL DAILY
COMMUNITY
End: 2025-06-17 | Stop reason: SDUPTHER

## 2025-06-17 RX ORDER — METOPROLOL TARTRATE 25 MG/1
TABLET, FILM COATED ORAL
Qty: 180 TABLET | Refills: 3 | Status: SHIPPED | OUTPATIENT
Start: 2025-06-17

## 2025-06-17 RX ORDER — NITROGLYCERIN 0.4 MG/1
TABLET SUBLINGUAL
Qty: 25 TABLET | Refills: 3 | Status: SHIPPED | OUTPATIENT
Start: 2025-06-17

## 2025-06-17 RX ORDER — CLOPIDOGREL BISULFATE 75 MG/1
75 TABLET ORAL DAILY
Qty: 90 TABLET | Refills: 3 | Status: SHIPPED | OUTPATIENT
Start: 2025-06-17

## 2025-06-17 NOTE — PROGRESS NOTES
[facility] [place] [facility]  [facility] CARDIOLOGY  [address] [place] [address]  Dept: [phone]  Dept Fax: [phone]  Loc: [phone]    Visit Date: 6/17/2025    Krish Abbasi is a 59 y.o. male who presents todayfor:  Chief Complaint   Patient presents with    Follow-up    Hypertension    Hyperlipidemia    Coronary Artery Disease     Known SCD and ICD  Cath 2016   Moderate CAD  No stents needed   No chest pain   No changes in breathing  No new symptoms  BP is stable  No dizziness  No syncope  On Repatha for hyperlipidemia      HPI:  HPI  Past Medical History:   Diagnosis Date    CAD (coronary artery disease)     CHF (congestive heart failure) ([facility])     Chronic kidney disease     Coronary artery disease involving native coronary artery of native heart with angina pectoris 9/6/2016    Hypertension     Hypoxic brain injury ([facility]) 08/04/2016    Cardiac arrest for 10 minutes    life vest 8/30/2016    NSTEMI (non-ST elevated myocardial infarction) ([facility]) 10/5/2016    Pneumonia     Precordial pain 9/6/2016    S/P cardiac catheterization: 8/8/2016: LM moderately calcified with no significant obstruction, LAD diffuse long segmental narrowing of about 70-75%, LCX OK, RCA OK. LVEF 40-45%. LVEDP 26 mmHg. 8/8/2016 8/8/2016: LM moderately calcified with no significant obstruction, LAD diffuse long segmental narrowing of about 70-75%, LCX OK, RCA OK. LVEF 40-45%. LVEDP 26 mmHg. Dr. Miller    S/P ICD (internal cardiac defibrillator) procedure: SICD placed by Dr. Reeves October 11/2016 11/16/2016      Past Surgical History:   Procedure Laterality Date    COLONOSCOPY  03/2016    HERNIA REPAIR  01/01/2004    HERNIA REPAIR  2006    PACEMAKER PLACEMENT      WISDOM TOOTH EXTRACTION Bilateral      Family History   Problem Relation Age of Onset    Heart Disease Mother     Asthma Mother     Diabetes Mother     High Blood Pressure Mother     High Cholesterol Mother     Diabetes Father

## 2025-06-17 NOTE — TELEPHONE ENCOUNTER
Cardiolite and echo scheduled on 6/24/25 at [facility], arrival time 7:45 am    Call to pts wife, Nurys, (on HIIPA), date, time and instructions reviewed

## 2025-06-25 DIAGNOSIS — E78.01 FAMILIAL HYPERCHOLESTEROLEMIA: ICD-10-CM

## 2025-06-25 DIAGNOSIS — I25.10 CORONARY ARTERY DISEASE INVOLVING NATIVE CORONARY ARTERY OF NATIVE HEART WITHOUT ANGINA PECTORIS: ICD-10-CM

## 2025-06-25 DIAGNOSIS — I10 PRIMARY HYPERTENSION: ICD-10-CM
